# Patient Record
Sex: FEMALE | Race: WHITE | Employment: FULL TIME | URBAN - METROPOLITAN AREA
[De-identification: names, ages, dates, MRNs, and addresses within clinical notes are randomized per-mention and may not be internally consistent; named-entity substitution may affect disease eponyms.]

---

## 2017-01-28 ENCOUNTER — OFFICE VISIT (OUTPATIENT)
Dept: FAMILY MEDICINE CLINIC | Facility: CLINIC | Age: 57
End: 2017-01-28

## 2017-01-28 ENCOUNTER — TELEPHONE (OUTPATIENT)
Dept: FAMILY MEDICINE CLINIC | Facility: CLINIC | Age: 57
End: 2017-01-28

## 2017-01-28 VITALS
SYSTOLIC BLOOD PRESSURE: 114 MMHG | TEMPERATURE: 98 F | BODY MASS INDEX: 43.87 KG/M2 | WEIGHT: 257 LBS | DIASTOLIC BLOOD PRESSURE: 70 MMHG | HEIGHT: 64 IN | HEART RATE: 88 BPM

## 2017-01-28 DIAGNOSIS — J06.9 VIRAL UPPER RESPIRATORY TRACT INFECTION: Primary | ICD-10-CM

## 2017-01-28 PROBLEM — Z90.89 S/P TONSILLECTOMY: Status: ACTIVE | Noted: 2017-01-28

## 2017-01-28 PROBLEM — E66.9 OBESITY: Status: ACTIVE | Noted: 2017-01-28

## 2017-01-28 PROCEDURE — 99214 OFFICE O/P EST MOD 30 MIN: CPT | Performed by: FAMILY MEDICINE

## 2017-01-28 RX ORDER — MULTIVITAMIN
1 CAPSULE ORAL DAILY
COMMUNITY
Start: 2014-09-25

## 2017-01-28 RX ORDER — SODIUM PHOSPHATE,MONO-DIBASIC 19G-7G/118
1 ENEMA (ML) RECTAL DAILY
COMMUNITY
Start: 2015-06-29

## 2017-01-28 RX ORDER — IBUPROFEN 200 MG
600 CAPSULE ORAL DAILY
COMMUNITY
Start: 2014-09-25

## 2017-01-28 RX ORDER — TIOCONAZOLE 6.5 %
1 OINTMENT WITH PREFILLED APPLICATOR VAGINAL DAILY
COMMUNITY
Start: 2014-09-25

## 2017-01-28 RX ORDER — LISINOPRIL 10 MG/1
TABLET ORAL
COMMUNITY
Start: 2014-11-11 | End: 2017-03-18

## 2017-01-28 RX ORDER — MAGNESIUM 200 MG
1 TABLET ORAL DAILY
COMMUNITY
Start: 2016-07-27

## 2017-01-28 RX ORDER — CHLORHEXIDINE/GLYCERIN/HE-CELL
1 JELLY (GRAM) TOPICAL DAILY
COMMUNITY
Start: 2014-09-25

## 2017-01-28 RX ORDER — LISINOPRIL 10 MG/1
10 TABLET ORAL
Refills: 1 | COMMUNITY
Start: 2016-12-20 | End: 2017-01-28

## 2017-01-28 RX ORDER — ALPHA LIPOIC ACID 200 MG
1 CAPSULE ORAL DAILY
COMMUNITY
Start: 2014-10-15

## 2017-01-28 RX ORDER — LACTOBACILLUS COMBINATION NO.4 3B CELL
1 CAPSULE ORAL DAILY
COMMUNITY
Start: 2014-09-25

## 2017-01-28 RX ORDER — BIOTIN 1 MG
1 TABLET ORAL DAILY
COMMUNITY
Start: 2014-09-25

## 2017-01-28 RX ORDER — METOPROLOL SUCCINATE 50 MG/1
TABLET, EXTENDED RELEASE ORAL
COMMUNITY
Start: 2014-10-15 | End: 2017-03-18

## 2017-01-28 NOTE — PATIENT INSTRUCTIONS
Rest, fluids, hydrate,   Use mucinex DM for nasal congestin and cough. Vitamin c, zinc, and garlic to help your immune system. Tylenol and ibuprofen as appropriate. Call if not improving in 10-14 days to be re-seen.   Saline nasal spray or nasal rinse

## 2017-01-28 NOTE — TELEPHONE ENCOUNTER
Patient with sinus infection. Was seen at urgent care earlier this week. Symptoms not resolving. Discussed with Dr. Osbaldo Marcano. Made appt for patient to see Dr. Osbaldo Marcano this morning.  Radu Longo Minor, 01/28/2017, 9:14 AM

## 2017-02-02 ENCOUNTER — TELEPHONE (OUTPATIENT)
Dept: FAMILY MEDICINE CLINIC | Facility: CLINIC | Age: 57
End: 2017-02-02

## 2017-02-02 NOTE — TELEPHONE ENCOUNTER
Called to patient who states that she thinks everything is ok. Not sure why she called earlier. Patient still with some sinus congestion. Has been using sinus rinse and that is helping. Discussed humidifier, mucinex DM, and vicks.  Also recommended changing

## 2017-03-02 ENCOUNTER — LAB ENCOUNTER (OUTPATIENT)
Dept: LAB | Age: 57
End: 2017-03-02
Attending: FAMILY MEDICINE
Payer: COMMERCIAL

## 2017-03-02 ENCOUNTER — OFFICE VISIT (OUTPATIENT)
Dept: FAMILY MEDICINE CLINIC | Facility: CLINIC | Age: 57
End: 2017-03-02

## 2017-03-02 VITALS
SYSTOLIC BLOOD PRESSURE: 148 MMHG | TEMPERATURE: 98 F | RESPIRATION RATE: 18 BRPM | BODY MASS INDEX: 44.35 KG/M2 | DIASTOLIC BLOOD PRESSURE: 84 MMHG | WEIGHT: 263 LBS | HEART RATE: 88 BPM | HEIGHT: 64.75 IN

## 2017-03-02 DIAGNOSIS — E78.49 FAMILIAL MULTIPLE LIPOPROTEIN-TYPE HYPERLIPIDEMIA: ICD-10-CM

## 2017-03-02 DIAGNOSIS — E66.01 MORBID OBESITY DUE TO EXCESS CALORIES (HCC): ICD-10-CM

## 2017-03-02 DIAGNOSIS — I83.12 VARICOSE VEINS OF BOTH LOWER EXTREMITIES WITH INFLAMMATION: Primary | ICD-10-CM

## 2017-03-02 DIAGNOSIS — Z01.419 ENCOUNTER FOR GYNECOLOGICAL EXAMINATION WITHOUT ABNORMAL FINDING: ICD-10-CM

## 2017-03-02 DIAGNOSIS — G47.33 OBSTRUCTIVE SLEEP APNEA SYNDROME: ICD-10-CM

## 2017-03-02 DIAGNOSIS — I83.11 VARICOSE VEINS OF BOTH LOWER EXTREMITIES WITH INFLAMMATION: Primary | ICD-10-CM

## 2017-03-02 DIAGNOSIS — N85.2 HYPERTROPHY OF UTERUS: ICD-10-CM

## 2017-03-02 DIAGNOSIS — E55.9 VITAMIN D DEFICIENCY: ICD-10-CM

## 2017-03-02 DIAGNOSIS — I10 ESSENTIAL HYPERTENSION: ICD-10-CM

## 2017-03-02 DIAGNOSIS — I83.12 VARICOSE VEINS OF BOTH LOWER EXTREMITIES WITH INFLAMMATION: ICD-10-CM

## 2017-03-02 DIAGNOSIS — R01.1 UNDIAGNOSED CARDIAC MURMURS: ICD-10-CM

## 2017-03-02 DIAGNOSIS — I83.11 VARICOSE VEINS OF BOTH LOWER EXTREMITIES WITH INFLAMMATION: ICD-10-CM

## 2017-03-02 LAB
25-HYDROXYVITAMIN D (TOTAL): 39.8 NG/ML (ref 30–100)
ALBUMIN SERPL-MCNC: 3.7 G/DL (ref 3.5–4.8)
ALP LIVER SERPL-CCNC: 92 U/L (ref 46–118)
ALT SERPL-CCNC: 18 U/L (ref 14–54)
AST SERPL-CCNC: 12 U/L (ref 15–41)
BASOPHILS # BLD AUTO: 0.01 X10(3) UL (ref 0–0.1)
BASOPHILS NFR BLD AUTO: 0.2 %
BILIRUB SERPL-MCNC: 0.5 MG/DL (ref 0.1–2)
BILIRUB UR QL STRIP.AUTO: NEGATIVE
BUN BLD-MCNC: 17 MG/DL (ref 8–20)
CALCIUM BLD-MCNC: 8.9 MG/DL (ref 8.3–10.3)
CHLORIDE: 107 MMOL/L (ref 101–111)
CHOLEST SMN-MCNC: 203 MG/DL (ref ?–200)
CLARITY UR REFRACT.AUTO: CLEAR
CO2: 28 MMOL/L (ref 22–32)
CREAT BLD-MCNC: 0.76 MG/DL (ref 0.55–1.02)
EOSINOPHIL # BLD AUTO: 0.11 X10(3) UL (ref 0–0.3)
EOSINOPHIL NFR BLD AUTO: 1.8 %
ERYTHROCYTE [DISTWIDTH] IN BLOOD BY AUTOMATED COUNT: 12.8 % (ref 11.5–16)
GLUCOSE BLD-MCNC: 89 MG/DL (ref 70–99)
GLUCOSE UR STRIP.AUTO-MCNC: NEGATIVE MG/DL
HCT VFR BLD AUTO: 41.4 % (ref 34–50)
HDLC SERPL-MCNC: 74 MG/DL (ref 45–?)
HDLC SERPL: 2.74 {RATIO} (ref ?–4.44)
HGB BLD-MCNC: 13.3 G/DL (ref 12–16)
IMMATURE GRANULOCYTE COUNT: 0.02 X10(3) UL (ref 0–1)
IMMATURE GRANULOCYTE RATIO %: 0.3 %
KETONES UR STRIP.AUTO-MCNC: NEGATIVE MG/DL
LDLC SERPL CALC-MCNC: 113 MG/DL (ref ?–130)
LEUKOCYTE ESTERASE UR QL STRIP.AUTO: NEGATIVE
LYMPHOCYTES # BLD AUTO: 1.84 X10(3) UL (ref 0.9–4)
LYMPHOCYTES NFR BLD AUTO: 30.3 %
M PROTEIN MFR SERPL ELPH: 7.3 G/DL (ref 6.1–8.3)
MCH RBC QN AUTO: 29.7 PG (ref 27–33.2)
MCHC RBC AUTO-ENTMCNC: 32.1 G/DL (ref 31–37)
MCV RBC AUTO: 92.4 FL (ref 81–100)
MONOCYTES # BLD AUTO: 0.28 X10(3) UL (ref 0.1–0.6)
MONOCYTES NFR BLD AUTO: 4.6 %
NEUTROPHIL ABS PRELIM: 3.82 X10 (3) UL (ref 1.3–6.7)
NEUTROPHILS # BLD AUTO: 3.82 X10(3) UL (ref 1.3–6.7)
NEUTROPHILS NFR BLD AUTO: 62.8 %
NITRITE UR QL STRIP.AUTO: NEGATIVE
NONHDLC SERPL-MCNC: 129 MG/DL (ref ?–130)
PH UR STRIP.AUTO: 7 [PH] (ref 4.5–8)
PLATELET # BLD AUTO: 278 10(3)UL (ref 150–450)
POTASSIUM SERPL-SCNC: 4.1 MMOL/L (ref 3.6–5.1)
PROT UR STRIP.AUTO-MCNC: NEGATIVE MG/DL
RBC # BLD AUTO: 4.48 X10(6)UL (ref 3.8–5.1)
RBC UR QL AUTO: NEGATIVE
RED CELL DISTRIBUTION WIDTH-SD: 42.5 FL (ref 35.1–46.3)
SODIUM SERPL-SCNC: 142 MMOL/L (ref 136–144)
SP GR UR STRIP.AUTO: 1.01 (ref 1–1.03)
TRIGLYCERIDES: 79 MG/DL (ref ?–150)
UROBILINOGEN UR STRIP.AUTO-MCNC: <2 MG/DL
VLDL: 16 MG/DL (ref 5–40)
WBC # BLD AUTO: 6.1 X10(3) UL (ref 4–13)

## 2017-03-02 PROCEDURE — 80053 COMPREHEN METABOLIC PANEL: CPT

## 2017-03-02 PROCEDURE — 88175 CYTOPATH C/V AUTO FLUID REDO: CPT | Performed by: FAMILY MEDICINE

## 2017-03-02 PROCEDURE — 81003 URINALYSIS AUTO W/O SCOPE: CPT

## 2017-03-02 PROCEDURE — 99214 OFFICE O/P EST MOD 30 MIN: CPT | Performed by: FAMILY MEDICINE

## 2017-03-02 PROCEDURE — 87624 HPV HI-RISK TYP POOLED RSLT: CPT | Performed by: FAMILY MEDICINE

## 2017-03-02 PROCEDURE — 99396 PREV VISIT EST AGE 40-64: CPT | Performed by: FAMILY MEDICINE

## 2017-03-02 PROCEDURE — 85025 COMPLETE CBC W/AUTO DIFF WBC: CPT

## 2017-03-02 PROCEDURE — 82306 VITAMIN D 25 HYDROXY: CPT

## 2017-03-02 PROCEDURE — 80061 LIPID PANEL: CPT

## 2017-03-02 NOTE — PROGRESS NOTES
[facility] [place]  PROGRESS NOTE        HPI:   This is a 60year old female coming in for Patient presents with:  Physical    HPI states she is doing very well has no new concerns.   Her last colonoscopy in 2014 a Tdap 819 of 2010 Pap 11/3/2015 Obliteration of lymphatic vessel     Obesity     Sleep apnea     S/P tonsillectomy     Undiagnosed cardiac murmurs     Hypertrophy of uterus     Varicose veins of lower extremities with inflammation     Varicose veins of both lower extremities     Vitamin confirmed negative in the right inguinal area and confirmed negative in the left inguinal area.    Genitourinary: Rectum normal and vagina normal. Rectal exam shows no external hemorrhoid, no internal hemorrhoid, no fissure, no mass, no tenderness and anal lipoprotein-type hyperlipidemia  -     CBC W Differential W Platelet [E]; Future  -     CMP; Future  -     Lipid Panel [E]; Future  -     Vitamin D, 25-Hydroxy [E]; Future  -     UA/M With Culture Reflex [E];  Future    Essential hypertension  -     CBC W D questions, complications, allergies, or worsening or changing symptoms. Parent is to call with any side effects or complications from the treatments as a result of today.        Deedee Hinton MD  3/2/2017  10:06 AM    There is no immunization history on fi

## 2017-03-03 ENCOUNTER — TELEPHONE (OUTPATIENT)
Dept: FAMILY MEDICINE CLINIC | Facility: CLINIC | Age: 57
End: 2017-03-03

## 2017-03-03 NOTE — TELEPHONE ENCOUNTER
----- Message from Wellington Sexton MD sent at 3/3/2017  4:22 AM CST -----  Vitamin D is near normal. Continue present managment   cholesterol are borderline,  Further attention to diet at this time.    Please give pt  Local vascular doctor's number:  Dr. Brooklynn Bowles

## 2017-03-03 NOTE — TELEPHONE ENCOUNTER
Patient notified of results and recommendations and expressed understanding.   Patient given contact number for Dr. Lucie Tim, 03/03/2017, 11:44 AM

## 2017-03-04 LAB — HPV I/H RISK 1 DNA SPEC QL NAA+PROBE: NEGATIVE

## 2017-03-07 ENCOUNTER — TELEPHONE (OUTPATIENT)
Dept: FAMILY MEDICINE CLINIC | Facility: CLINIC | Age: 57
End: 2017-03-07

## 2017-03-07 NOTE — TELEPHONE ENCOUNTER
----- Message from Magdi Reynoso MD sent at 3/7/2017 10:06 AM CST -----  Negative pap and HPV. Repeat pap in 5 years   Or as clinically decided.

## 2017-03-07 NOTE — TELEPHONE ENCOUNTER
Patient notified of results and recommendations and expressed understanding.     Sherry Tim, 03/07/2017, 10:26 AM

## 2017-03-10 ENCOUNTER — MED REC SCAN ONLY (OUTPATIENT)
Dept: FAMILY MEDICINE CLINIC | Facility: CLINIC | Age: 57
End: 2017-03-10

## 2017-03-10 ENCOUNTER — MA CHART PREP (OUTPATIENT)
Dept: FAMILY MEDICINE CLINIC | Facility: CLINIC | Age: 57
End: 2017-03-10

## 2017-03-10 LAB — HEP C VIRUS AB: NEGATIVE

## 2017-03-18 RX ORDER — LISINOPRIL 10 MG/1
TABLET ORAL
Qty: 90 TABLET | Refills: 1 | Status: SHIPPED | OUTPATIENT
Start: 2017-03-18 | End: 2017-09-17

## 2017-03-18 RX ORDER — METOPROLOL SUCCINATE 50 MG/1
TABLET, EXTENDED RELEASE ORAL
Qty: 90 TABLET | Refills: 1 | Status: SHIPPED | OUTPATIENT
Start: 2017-03-18 | End: 2017-09-17

## 2017-06-19 ENCOUNTER — TELEPHONE (OUTPATIENT)
Dept: FAMILY MEDICINE CLINIC | Facility: CLINIC | Age: 57
End: 2017-06-19

## 2017-06-20 NOTE — TELEPHONE ENCOUNTER
Patient states she was having left arm pain over the weekend  Started at the shoulder and went down to her thumb  Went to the ER  States they did EKG and was normal  Patient states she knows she has bursitis in her shoulder and tendonitis in her elbow  Als

## 2017-06-20 NOTE — TELEPHONE ENCOUNTER
Patient notified and expressed understanding  Patient states she thinks the problem is her thumb  States there is swelling around thumb  States she has a \"hand exerciser\" that she uses and when she does it with the thumb she feels a tinge of pain up to h

## 2017-09-18 RX ORDER — LISINOPRIL 10 MG/1
TABLET ORAL
Qty: 90 TABLET | Refills: 1 | Status: SHIPPED | OUTPATIENT
Start: 2017-09-18 | End: 2018-03-14

## 2017-09-18 RX ORDER — METOPROLOL SUCCINATE 50 MG/1
TABLET, EXTENDED RELEASE ORAL
Qty: 90 TABLET | Refills: 1 | Status: SHIPPED | OUTPATIENT
Start: 2017-09-18 | End: 2018-03-14

## 2017-09-18 NOTE — TELEPHONE ENCOUNTER
Future appt:  None  Last Appointment:  3/2/2017    Cholesterol, Total (mg/dL)   Date Value   03/02/2017 203 (H)   ----------  HDL Cholesterol (mg/dL)   Date Value   03/02/2017 74   ----------  LDL Cholesterol (mg/dL)   Date Value   03/02/2017 113   -------

## 2018-03-14 RX ORDER — METOPROLOL SUCCINATE 50 MG/1
TABLET, EXTENDED RELEASE ORAL
Qty: 90 TABLET | Refills: 0 | Status: SHIPPED | OUTPATIENT
Start: 2018-03-14 | End: 2018-06-10

## 2018-03-14 RX ORDER — LISINOPRIL 10 MG/1
TABLET ORAL
Qty: 90 TABLET | Refills: 0 | Status: SHIPPED | OUTPATIENT
Start: 2018-03-14 | End: 2018-06-10

## 2018-03-14 NOTE — TELEPHONE ENCOUNTER
Pt is due for follow up appointment and fasting labs    Please have pt  Schedule apptointment . Refills until appointmentt ok.

## 2018-03-14 NOTE — TELEPHONE ENCOUNTER
Future appt:  None  Last Appointment:  3/2/2017; Follow up for sleep in 1 months sooner if problems.     Cholesterol, Total (mg/dL)   Date Value   03/02/2017 203 (H)   ----------  HDL Cholesterol (mg/dL)   Date Value   03/02/2017 74   ----------  LDL Choles

## 2018-03-14 NOTE — TELEPHONE ENCOUNTER
Pt informed.   Future Appointments  Date Time Provider Carmen Barrera   3/24/2018 9:15 AM REF [place] [facility]   4/19/2018 4:00 PM Babak Kearns MD [facility] [place] [facility] [place]

## 2018-03-21 ENCOUNTER — TELEPHONE (OUTPATIENT)
Dept: FAMILY MEDICINE CLINIC | Facility: CLINIC | Age: 58
End: 2018-03-21

## 2018-03-21 DIAGNOSIS — E78.49 FAMILIAL MULTIPLE LIPOPROTEIN-TYPE HYPERLIPIDEMIA: ICD-10-CM

## 2018-03-21 DIAGNOSIS — E66.01 CLASS 3 SEVERE OBESITY DUE TO EXCESS CALORIES WITHOUT SERIOUS COMORBIDITY WITH BODY MASS INDEX (BMI) OF 40.0 TO 44.9 IN ADULT (HCC): ICD-10-CM

## 2018-03-21 DIAGNOSIS — I10 ESSENTIAL HYPERTENSION: Primary | ICD-10-CM

## 2018-03-21 DIAGNOSIS — E55.9 VITAMIN D DEFICIENCY: ICD-10-CM

## 2018-03-21 NOTE — TELEPHONE ENCOUNTER
----- Message from Antwan Gamboa sent at 3/21/2018 11:37 AM CDT -----  Regarding: lab orders needed   Patient has lab appointment on 3/24/18 could you please put lab orders in system.         Thanks,  Beba

## 2018-03-23 NOTE — TELEPHONE ENCOUNTER
Labs in chart are from March 2017 - this was a year ago  Please advise fasting lab orders  Patient has appt tomorrow  Dr. Mukesh Lindsay out of the office  Forwarded to Kishan Foster for review    Linda Tim, 03/23/18, 9:35 AM

## 2018-03-23 NOTE — TELEPHONE ENCOUNTER
Patient just had complete labs in may is there something in particular she was wanted rechecked? Her cholesterol is borderline so I suggest rechecking it in September.

## 2018-03-30 ENCOUNTER — LABORATORY ENCOUNTER (OUTPATIENT)
Dept: LAB | Age: 58
End: 2018-03-30
Attending: FAMILY MEDICINE
Payer: COMMERCIAL

## 2018-03-30 DIAGNOSIS — E66.01 CLASS 3 SEVERE OBESITY DUE TO EXCESS CALORIES WITHOUT SERIOUS COMORBIDITY WITH BODY MASS INDEX (BMI) OF 40.0 TO 44.9 IN ADULT (HCC): ICD-10-CM

## 2018-03-30 DIAGNOSIS — E78.49 FAMILIAL MULTIPLE LIPOPROTEIN-TYPE HYPERLIPIDEMIA: ICD-10-CM

## 2018-03-30 DIAGNOSIS — I10 ESSENTIAL HYPERTENSION: ICD-10-CM

## 2018-03-30 DIAGNOSIS — E55.9 VITAMIN D DEFICIENCY: ICD-10-CM

## 2018-03-30 PROCEDURE — 80050 GENERAL HEALTH PANEL: CPT | Performed by: FAMILY MEDICINE

## 2018-03-30 PROCEDURE — 36415 COLL VENOUS BLD VENIPUNCTURE: CPT | Performed by: FAMILY MEDICINE

## 2018-03-30 PROCEDURE — 80061 LIPID PANEL: CPT | Performed by: FAMILY MEDICINE

## 2018-03-30 PROCEDURE — 82306 VITAMIN D 25 HYDROXY: CPT | Performed by: FAMILY MEDICINE

## 2018-03-31 ENCOUNTER — TELEPHONE (OUTPATIENT)
Dept: FAMILY MEDICINE CLINIC | Facility: CLINIC | Age: 58
End: 2018-03-31

## 2018-03-31 DIAGNOSIS — E55.9 VITAMIN D DEFICIENCY: Primary | ICD-10-CM

## 2018-03-31 NOTE — TELEPHONE ENCOUNTER
----- Message from Kika Lang MD sent at 3/31/2018  7:57 AM CDT -----  Labs are ok. Vitamin D is sub therapeutic. Recommend 2000 units of vitamin D daily  Recheck vitamin D level in 6  months.

## 2018-04-19 ENCOUNTER — OFFICE VISIT (OUTPATIENT)
Dept: FAMILY MEDICINE CLINIC | Facility: CLINIC | Age: 58
End: 2018-04-19

## 2018-04-19 VITALS
TEMPERATURE: 98 F | BODY MASS INDEX: 45.57 KG/M2 | RESPIRATION RATE: 20 BRPM | WEIGHT: 270.19 LBS | DIASTOLIC BLOOD PRESSURE: 72 MMHG | HEART RATE: 72 BPM | HEIGHT: 64.75 IN | SYSTOLIC BLOOD PRESSURE: 126 MMHG

## 2018-04-19 DIAGNOSIS — I10 ESSENTIAL HYPERTENSION: ICD-10-CM

## 2018-04-19 DIAGNOSIS — E78.49 FAMILIAL MULTIPLE LIPOPROTEIN-TYPE HYPERLIPIDEMIA: Primary | ICD-10-CM

## 2018-04-19 PROCEDURE — 99214 OFFICE O/P EST MOD 30 MIN: CPT | Performed by: FAMILY MEDICINE

## 2018-04-19 NOTE — PROGRESS NOTES
[facility] [place]  PROGRESS NOTE        HPI:   This is a 59year old female coming in for Patient presents with:  Medication Request: Refills on her medications. HPI  Patient is overall doing very well.   taking her medications regularly. uL   Monocyte Absolute 0.34 0.10 - 1.00 x10(3) uL   Eosinophil Absolute 0.13 0.00 - 0.30 x10(3) uL   Basophil Absolute 0.02 0.00 - 0.10 x10(3) uL   Immature Granulocyte Absolute 0.01 0.00 - 1.00 x10(3) uL   Neutrophil % 62.2 %   Lymphocyte % 28.8 %   Monoc examination     Essential hypertension     Lipoma     Obliteration of lymphatic vessel     Obesity     Sleep apnea     S/P tonsillectomy     Undiagnosed cardiac murmurs     Hypertrophy of uterus     Varicose veins of lower extremities with inflammation Musculoskeletal: Normal range of motion. Neurological: She is alert and oriented to person, place, and time. Skin: Skin is warm and dry. She is not diaphoretic. Psychiatric: She has a normal mood and affect.  Her behavior is normal. Judgment and tho

## 2018-06-11 RX ORDER — METOPROLOL SUCCINATE 50 MG/1
TABLET, EXTENDED RELEASE ORAL
Qty: 90 TABLET | Refills: 0 | Status: SHIPPED | OUTPATIENT
Start: 2018-06-11 | End: 2018-09-15

## 2018-06-11 RX ORDER — LISINOPRIL 10 MG/1
TABLET ORAL
Qty: 90 TABLET | Refills: 0 | Status: SHIPPED | OUTPATIENT
Start: 2018-06-11 | End: 2018-09-15

## 2018-06-11 NOTE — TELEPHONE ENCOUNTER
Future appt:     Your appointments     Date & Time Appointment Department [facility])    Oct 27, 2018  9:00 AM CDT Physical - Established Patient with Lacie Gibbs MD [address] [place] [address] [facility]

## 2018-06-15 RX ORDER — LISINOPRIL 10 MG/1
TABLET ORAL
Qty: 90 TABLET | Refills: 0 | OUTPATIENT
Start: 2018-06-15

## 2018-06-15 RX ORDER — METOPROLOL SUCCINATE 50 MG/1
TABLET, EXTENDED RELEASE ORAL
Qty: 90 TABLET | Refills: 0 | OUTPATIENT
Start: 2018-06-15

## 2018-06-15 NOTE — TELEPHONE ENCOUNTER
Future appt:     Your appointments     Date & Time Appointment Department [facility])    Oct 27, 2018  9:00 AM CDT Physical - Established Patient with Merari Cedeno MD [address] [place] [address]

## 2018-06-15 NOTE — TELEPHONE ENCOUNTER
Meds refilled on 6/11/18  Receipt confirmed by pharmacy    These refills denied    Sandip Tim, 06/15/18, 11:14 AM

## 2018-09-17 RX ORDER — METOPROLOL SUCCINATE 50 MG/1
TABLET, EXTENDED RELEASE ORAL
Qty: 90 TABLET | Refills: 1 | Status: SHIPPED | OUTPATIENT
Start: 2018-09-17 | End: 2018-11-03

## 2018-09-17 RX ORDER — LISINOPRIL 10 MG/1
TABLET ORAL
Qty: 90 TABLET | Refills: 1 | Status: SHIPPED | OUTPATIENT
Start: 2018-09-17 | End: 2018-11-03

## 2018-09-17 NOTE — TELEPHONE ENCOUNTER
Future appt:     Your appointments     Date & Time Appointment Department [facility])    Oct 27, 2018  9:00 AM CDT Physical - Established Patient with Kailash Alvarez MD [address] [place] [address]        Viri Serrano

## 2018-11-03 ENCOUNTER — APPOINTMENT (OUTPATIENT)
Dept: LAB | Age: 58
End: 2018-11-03
Attending: FAMILY MEDICINE
Payer: COMMERCIAL

## 2018-11-03 ENCOUNTER — OFFICE VISIT (OUTPATIENT)
Dept: FAMILY MEDICINE CLINIC | Facility: CLINIC | Age: 58
End: 2018-11-03
Payer: COMMERCIAL

## 2018-11-03 VITALS
RESPIRATION RATE: 18 BRPM | SYSTOLIC BLOOD PRESSURE: 126 MMHG | HEIGHT: 64.25 IN | DIASTOLIC BLOOD PRESSURE: 72 MMHG | OXYGEN SATURATION: 96 % | BODY MASS INDEX: 46.2 KG/M2 | HEART RATE: 95 BPM | WEIGHT: 270.63 LBS | TEMPERATURE: 98 F

## 2018-11-03 DIAGNOSIS — I10 ESSENTIAL HYPERTENSION: ICD-10-CM

## 2018-11-03 DIAGNOSIS — E55.9 VITAMIN D DEFICIENCY: ICD-10-CM

## 2018-11-03 DIAGNOSIS — Z00.00 ENCOUNTER FOR ROUTINE ADULT HEALTH EXAMINATION WITHOUT ABNORMAL FINDINGS: Primary | ICD-10-CM

## 2018-11-03 DIAGNOSIS — E78.49 FAMILIAL MULTIPLE LIPOPROTEIN-TYPE HYPERLIPIDEMIA: ICD-10-CM

## 2018-11-03 PROCEDURE — 87086 URINE CULTURE/COLONY COUNT: CPT | Performed by: FAMILY MEDICINE

## 2018-11-03 PROCEDURE — 82607 VITAMIN B-12: CPT | Performed by: FAMILY MEDICINE

## 2018-11-03 PROCEDURE — 87186 SC STD MICRODIL/AGAR DIL: CPT | Performed by: FAMILY MEDICINE

## 2018-11-03 PROCEDURE — 36415 COLL VENOUS BLD VENIPUNCTURE: CPT | Performed by: FAMILY MEDICINE

## 2018-11-03 PROCEDURE — 82550 ASSAY OF CK (CPK): CPT | Performed by: FAMILY MEDICINE

## 2018-11-03 PROCEDURE — 99396 PREV VISIT EST AGE 40-64: CPT | Performed by: FAMILY MEDICINE

## 2018-11-03 PROCEDURE — 87088 URINE BACTERIA CULTURE: CPT | Performed by: FAMILY MEDICINE

## 2018-11-03 PROCEDURE — 93000 ELECTROCARDIOGRAM COMPLETE: CPT | Performed by: FAMILY MEDICINE

## 2018-11-03 PROCEDURE — 80053 COMPREHEN METABOLIC PANEL: CPT | Performed by: FAMILY MEDICINE

## 2018-11-03 PROCEDURE — 81001 URINALYSIS AUTO W/SCOPE: CPT | Performed by: FAMILY MEDICINE

## 2018-11-03 PROCEDURE — 80061 LIPID PANEL: CPT | Performed by: FAMILY MEDICINE

## 2018-11-03 PROCEDURE — 82306 VITAMIN D 25 HYDROXY: CPT | Performed by: FAMILY MEDICINE

## 2018-11-03 PROCEDURE — 87077 CULTURE AEROBIC IDENTIFY: CPT | Performed by: FAMILY MEDICINE

## 2018-11-03 PROCEDURE — 84443 ASSAY THYROID STIM HORMONE: CPT | Performed by: FAMILY MEDICINE

## 2018-11-03 RX ORDER — METOPROLOL SUCCINATE 50 MG/1
50 TABLET, EXTENDED RELEASE ORAL
Qty: 90 TABLET | Refills: 1 | Status: SHIPPED | OUTPATIENT
Start: 2018-11-03 | End: 2018-11-05

## 2018-11-03 RX ORDER — LISINOPRIL 10 MG/1
10 TABLET ORAL
Qty: 90 TABLET | Refills: 1 | Status: SHIPPED | OUTPATIENT
Start: 2018-11-03 | End: 2018-11-05

## 2018-11-03 NOTE — PROGRESS NOTES
[facility] [place]  PROGRESS NOTE        HPI:   This is a 59year old female coming in for Patient presents with:  Physical    HPI  Pt is overall doing well.    Has some arthrits in her hands which bothers her during her working out, previously s 0. 34 0.10 - 1.00 x10(3) uL    Eosinophil Absolute 0.13 0.00 - 0.30 x10(3) uL    Basophil Absolute 0.02 0.00 - 0.10 x10(3) uL    Immature Granulocyte Absolute 0.01 0.00 - 1.00 x10(3) uL    Neutrophil % 62.2 %    Lymphocyte % 28.8 %    Monocyte % 6.1 %    Eo OIL) 1000 MG Oral Cap Take 1 capsule by mouth daily. Disp:  Rfl:    Probiotic Product ([employer] PROBIOTIC) Oral Cap Take 1 capsule by mouth daily.    Disp:  Rfl:       Counseling given: Not Answered         Problem List:  Patient Active Problem List:     Clemencia Cueto regular rhythm and normal heart sounds. Pulmonary/Chest: Effort normal and breath sounds normal.   Abdominal: Soft. Bowel sounds are normal. She exhibits no distension. Musculoskeletal: Normal range of motion.    Curvature of digits, with enlarged dip's appointment:    No Follow-up on file. Meds & Refills for this Visit:  Requested Prescriptions     Signed Prescriptions Disp Refills   • lisinopril 10 MG Oral Tab 90 tablet 1     Sig: Take 1 tablet (10 mg total) by mouth once daily.    • Metoprolol Succin

## 2018-11-05 ENCOUNTER — TELEPHONE (OUTPATIENT)
Dept: FAMILY MEDICINE CLINIC | Facility: CLINIC | Age: 58
End: 2018-11-05

## 2018-11-05 DIAGNOSIS — N39.0 URINARY TRACT INFECTION WITHOUT HEMATURIA, SITE UNSPECIFIED: Primary | ICD-10-CM

## 2018-11-05 RX ORDER — METOPROLOL SUCCINATE 50 MG/1
50 TABLET, EXTENDED RELEASE ORAL
Qty: 90 TABLET | Refills: 1 | Status: SHIPPED | OUTPATIENT
Start: 2018-11-05 | End: 2018-12-10

## 2018-11-05 RX ORDER — LISINOPRIL 10 MG/1
10 TABLET ORAL
Qty: 90 TABLET | Refills: 1 | Status: SHIPPED | OUTPATIENT
Start: 2018-11-05 | End: 2019-06-01

## 2018-11-05 RX ORDER — CEPHALEXIN 500 MG/1
500 CAPSULE ORAL 3 TIMES DAILY
Qty: 21 CAPSULE | Refills: 0 | Status: SHIPPED | OUTPATIENT
Start: 2018-11-05 | End: 2018-11-12

## 2018-11-05 NOTE — TELEPHONE ENCOUNTER
----- Message from Sarah Yusuf MD sent at 11/5/2018 11:07 AM CST -----  Recommend start Keflex 500 mg 3 times daily times 7 days follow-up UA and culture in 1 week after finishing medicine. Advised to increase fluid intake.    Call if increasing pain or

## 2018-11-05 NOTE — TELEPHONE ENCOUNTER
Left message for patient to call office.     [facility], 11/05/18, 12:26 PM

## 2018-11-05 NOTE — TELEPHONE ENCOUNTER
----- Message from Zechariah Samson MD sent at 11/5/2018  8:22 AM CST -----  b12 is a bit high, can reduce supplementations. Continue vitamin d supplementation. Other labs look ok.    Continue present managment

## 2018-11-05 NOTE — TELEPHONE ENCOUNTER
Patient notified of results and recommendations and expressed understanding.   Script to [address] per patient request  Order for repeat testing into chart    Cass Ferris, 11/05/18, 5:51 PM

## 2018-11-06 NOTE — TELEPHONE ENCOUNTER
Patient's Lisinopril and Metoprolol were refilled to [facility] on 11/3/18  Patient states these need to go to [facility] due to insurance  Needs new scripts sent to [facility], 11/05/18, 6:31 PM

## 2018-11-14 ENCOUNTER — LABORATORY ENCOUNTER (OUTPATIENT)
Dept: LAB | Age: 58
End: 2018-11-14
Attending: FAMILY MEDICINE
Payer: COMMERCIAL

## 2018-11-14 DIAGNOSIS — N39.0 URINARY TRACT INFECTION WITHOUT HEMATURIA, SITE UNSPECIFIED: ICD-10-CM

## 2018-11-14 PROCEDURE — 87086 URINE CULTURE/COLONY COUNT: CPT | Performed by: FAMILY MEDICINE

## 2018-11-14 PROCEDURE — 81003 URINALYSIS AUTO W/O SCOPE: CPT | Performed by: FAMILY MEDICINE

## 2018-11-17 ENCOUNTER — TELEPHONE (OUTPATIENT)
Dept: FAMILY MEDICINE CLINIC | Facility: CLINIC | Age: 58
End: 2018-11-17

## 2018-11-20 ENCOUNTER — TELEPHONE (OUTPATIENT)
Dept: FAMILY MEDICINE CLINIC | Facility: CLINIC | Age: 58
End: 2018-11-20

## 2018-11-20 DIAGNOSIS — R31.9 URINARY TRACT INFECTION WITH HEMATURIA, SITE UNSPECIFIED: Primary | ICD-10-CM

## 2018-11-20 DIAGNOSIS — N39.0 URINARY TRACT INFECTION WITH HEMATURIA, SITE UNSPECIFIED: Primary | ICD-10-CM

## 2018-11-20 NOTE — TELEPHONE ENCOUNTER
Re:   had to go to [facility]  - should be recieving results - please call hospital to get these results  -   UA  /  Yeast infection

## 2018-11-20 NOTE — TELEPHONE ENCOUNTER
Patient was seen at [facility] ER on 11/17/18 for urinary symptoms and vaginal burning  States burning was so bad she could not sit down  Patient treated for UTI with Macrobid  Patient treated for yeast with Diflucan    Patient have urine rechecked once done wit

## 2018-12-10 RX ORDER — METOPROLOL SUCCINATE 50 MG/1
50 TABLET, EXTENDED RELEASE ORAL
Qty: 90 TABLET | Refills: 1 | Status: SHIPPED | OUTPATIENT
Start: 2018-12-10 | End: 2019-06-01

## 2018-12-10 NOTE — TELEPHONE ENCOUNTER
Future appt:     Your appointments     Date & Time Appointment Department [facility])    Jun 01, 2019  8:00 AM CDT Follow up with Yoselin Perez MD [facility])        [address] [facility]

## 2019-06-01 ENCOUNTER — OFFICE VISIT (OUTPATIENT)
Dept: FAMILY MEDICINE CLINIC | Facility: CLINIC | Age: 59
End: 2019-06-01
Payer: COMMERCIAL

## 2019-06-01 VITALS
SYSTOLIC BLOOD PRESSURE: 114 MMHG | TEMPERATURE: 98 F | WEIGHT: 277.19 LBS | HEIGHT: 64.25 IN | RESPIRATION RATE: 18 BRPM | BODY MASS INDEX: 47.32 KG/M2 | DIASTOLIC BLOOD PRESSURE: 70 MMHG | HEART RATE: 80 BPM

## 2019-06-01 DIAGNOSIS — I10 ESSENTIAL HYPERTENSION: Primary | ICD-10-CM

## 2019-06-01 DIAGNOSIS — E55.9 VITAMIN D DEFICIENCY: ICD-10-CM

## 2019-06-01 DIAGNOSIS — E78.49 FAMILIAL MULTIPLE LIPOPROTEIN-TYPE HYPERLIPIDEMIA: ICD-10-CM

## 2019-06-01 PROCEDURE — 80050 GENERAL HEALTH PANEL: CPT | Performed by: FAMILY MEDICINE

## 2019-06-01 PROCEDURE — 82306 VITAMIN D 25 HYDROXY: CPT | Performed by: FAMILY MEDICINE

## 2019-06-01 PROCEDURE — 82607 VITAMIN B-12: CPT | Performed by: FAMILY MEDICINE

## 2019-06-01 PROCEDURE — 80061 LIPID PANEL: CPT | Performed by: FAMILY MEDICINE

## 2019-06-01 PROCEDURE — 99214 OFFICE O/P EST MOD 30 MIN: CPT | Performed by: FAMILY MEDICINE

## 2019-06-01 PROCEDURE — 84550 ASSAY OF BLOOD/URIC ACID: CPT | Performed by: FAMILY MEDICINE

## 2019-06-01 PROCEDURE — 82550 ASSAY OF CK (CPK): CPT | Performed by: FAMILY MEDICINE

## 2019-06-01 RX ORDER — LISINOPRIL 10 MG/1
10 TABLET ORAL
Qty: 90 TABLET | Refills: 1 | Status: SHIPPED | OUTPATIENT
Start: 2019-06-01 | End: 2019-11-09

## 2019-06-01 RX ORDER — METOPROLOL SUCCINATE 50 MG/1
50 TABLET, EXTENDED RELEASE ORAL
Qty: 90 TABLET | Refills: 1 | Status: SHIPPED | OUTPATIENT
Start: 2019-06-01 | End: 2019-11-09

## 2019-06-01 NOTE — PROGRESS NOTES
[facility] [place]  PROGRESS NOTE        HPI:   This is a 59year old female coming in for Patient presents with:  Blood Pressure: bp check    HPI    Results for orders placed or performed in visit on 11/14/18   URINALYSIS, ROUTINE   Result Valu Take 600 mg by mouth daily. Disp:  Rfl:    Cholecalciferol (VITAMIN D3) 1000 units Oral Cap Take 1 tablet by mouth daily. Disp:  Rfl:    Garlic 033 MG Oral Tab Take 1 tablet by mouth daily.    Disp:  Rfl:    Glucosamine-Chondroitin ([facility] GLUCOSAMINE-KAREEM this encounter: 277 lb 3.2 oz. Vital signs reviewed.   Wt Readings from Last 6 Encounters:  06/01/19 : 277 lb 3.2 oz  11/03/18 : 270 lb 9.6 oz  04/19/18 : 270 lb 3.2 oz  03/02/17 : 263 lb  01/28/17 : 257 lb       Physical Exam   Constitutional: She is kinjal deficiency  -     VITAMIN D, 25-HYDROXY; Future    Other orders  -     lisinopril 10 MG Oral Tab; Take 1 tablet (10 mg total) by mouth once daily.  -     Metoprolol Succinate ER 50 MG Oral Tablet 24 Hr; Take 1 tablet (50 mg total) by mouth once daily.

## 2019-06-11 ENCOUNTER — TELEPHONE (OUTPATIENT)
Dept: FAMILY MEDICINE CLINIC | Facility: CLINIC | Age: 59
End: 2019-06-11

## 2019-11-09 ENCOUNTER — OFFICE VISIT (OUTPATIENT)
Dept: FAMILY MEDICINE CLINIC | Facility: CLINIC | Age: 59
End: 2019-11-09
Payer: COMMERCIAL

## 2019-11-09 VITALS
WEIGHT: 270.81 LBS | SYSTOLIC BLOOD PRESSURE: 122 MMHG | TEMPERATURE: 98 F | HEART RATE: 98 BPM | RESPIRATION RATE: 16 BRPM | HEIGHT: 64.25 IN | BODY MASS INDEX: 46.23 KG/M2 | OXYGEN SATURATION: 94 % | DIASTOLIC BLOOD PRESSURE: 68 MMHG

## 2019-11-09 DIAGNOSIS — E78.49 FAMILIAL MULTIPLE LIPOPROTEIN-TYPE HYPERLIPIDEMIA: ICD-10-CM

## 2019-11-09 DIAGNOSIS — I10 ESSENTIAL HYPERTENSION: ICD-10-CM

## 2019-11-09 DIAGNOSIS — Z00.00 ENCOUNTER FOR ROUTINE ADULT HEALTH EXAMINATION WITHOUT ABNORMAL FINDINGS: Primary | ICD-10-CM

## 2019-11-09 DIAGNOSIS — E55.9 VITAMIN D DEFICIENCY: ICD-10-CM

## 2019-11-09 DIAGNOSIS — R39.89 ABNORMAL URINE COLOR: ICD-10-CM

## 2019-11-09 PROCEDURE — 81003 URINALYSIS AUTO W/O SCOPE: CPT | Performed by: FAMILY MEDICINE

## 2019-11-09 PROCEDURE — 99396 PREV VISIT EST AGE 40-64: CPT | Performed by: FAMILY MEDICINE

## 2019-11-09 RX ORDER — LISINOPRIL 10 MG/1
10 TABLET ORAL
Qty: 90 TABLET | Refills: 1 | Status: SHIPPED | OUTPATIENT
Start: 2019-11-09 | End: 2020-05-01

## 2019-11-09 RX ORDER — METOPROLOL SUCCINATE 50 MG/1
50 TABLET, EXTENDED RELEASE ORAL
Qty: 90 TABLET | Refills: 1 | Status: SHIPPED | OUTPATIENT
Start: 2019-11-09 | End: 2019-11-09

## 2019-11-09 RX ORDER — METOPROLOL SUCCINATE 50 MG/1
50 TABLET, EXTENDED RELEASE ORAL
Qty: 90 TABLET | Refills: 1 | Status: SHIPPED | OUTPATIENT
Start: 2019-11-09 | End: 2020-06-20

## 2019-11-09 RX ORDER — LISINOPRIL 10 MG/1
10 TABLET ORAL
Qty: 90 TABLET | Refills: 1 | Status: SHIPPED | OUTPATIENT
Start: 2019-11-09 | End: 2019-11-09

## 2019-11-09 NOTE — PROGRESS NOTES
[facility] [place]  PROGRESS NOTE        HPI:   This is a 62year old female coming in for Patient presents with:  Physical    HPI patient has had sinus congesion and ear congestion and has been hanging around since October but now is getting be 25-HYDROXY   Result Value Ref Range    25-Hydroxyvitamin D (Total) 35.1 30.0 - 100.0 ng/mL   CBC W/ DIFFERENTIAL   Result Value Ref Range    WBC 5.9 4.0 - 11.0 x10(3) uL    RBC 4.82 3.80 - 5.30 x10(6)uL    HGB 14.0 12.0 - 16.0 g/dL    HCT 43.8 35.0 - 48.0 Succinate ER 50 MG Oral Tablet 24 Hr Take 1 tablet (50 mg total) by mouth once daily. 90 tablet 1   • B Complex Vitamins (B COMPLEX-B12) Oral Tab Take 1 tablet by mouth daily. • Calcium 600 MG Oral Tab Take 600 mg by mouth daily.        • Cholecalcife Musculoskeletal: Negative. Skin: Negative. Allergic/Immunologic: Negative. Neurological: Negative. Hematological: Negative. Psychiatric/Behavioral: Negative. All other systems reviewed and are negative.       EXAM:   /68 (BP Locati behavior is normal. Judgment and thought content normal.       ASSESSMENT AND PLAN:   Darcy Wayne was seen today for physical.    Diagnoses and all orders for this visit:    Encounter for routine adult health examination without abnormal findings    Essential hyp Administered Date(s) Administered   • DTAP 08/19/2010       Most Recent Immunizations  Administered            Date(s) Administered    DTAP                  08/19/2010    Deferred                Date(s) Deferred    FLULAVAL 6 months & older 0.5 ml Prefil

## 2019-11-11 ENCOUNTER — TELEPHONE (OUTPATIENT)
Dept: FAMILY MEDICINE CLINIC | Facility: CLINIC | Age: 59
End: 2019-11-11

## 2019-11-11 NOTE — TELEPHONE ENCOUNTER
LM for patient to return call. Doesn't look like labs were drawn here unless patient was having drawn elsewhere.

## 2019-11-11 NOTE — TELEPHONE ENCOUNTER
----- Message from Kiera Saldana MD sent at 11/11/2019  7:21 AM CST -----  Normal urinalysis. Await other labs.

## 2019-11-14 NOTE — TELEPHONE ENCOUNTER
Patient informed of the below results. Patient states she has an appt in December to have her labs drawn.      Future Appointments   Date Time Provider Carmen Barrera   12/14/2019  8:15 AM REF [place] [facility]

## 2019-12-05 DIAGNOSIS — I10 ESSENTIAL HYPERTENSION: Primary | ICD-10-CM

## 2019-12-07 NOTE — TELEPHONE ENCOUNTER
Refill request from [facility]    Future appt:     Your appointments     Date & Time Appointment Department [facility])    Dec 14, 2019  8:15 AM CST Laboratory Visit with REF [facility] Lat[facility])            [facility]  [facility]

## 2019-12-09 RX ORDER — METOPROLOL SUCCINATE 50 MG/1
TABLET, EXTENDED RELEASE ORAL
Qty: 90 TABLET | Refills: 1 | Status: SHIPPED | OUTPATIENT
Start: 2019-12-09 | End: 2020-06-20

## 2019-12-14 ENCOUNTER — APPOINTMENT (OUTPATIENT)
Dept: LAB | Age: 59
End: 2019-12-14
Attending: FAMILY MEDICINE
Payer: COMMERCIAL

## 2019-12-14 DIAGNOSIS — I10 ESSENTIAL HYPERTENSION: ICD-10-CM

## 2019-12-14 DIAGNOSIS — E78.49 FAMILIAL MULTIPLE LIPOPROTEIN-TYPE HYPERLIPIDEMIA: ICD-10-CM

## 2019-12-14 PROCEDURE — 82550 ASSAY OF CK (CPK): CPT | Performed by: FAMILY MEDICINE

## 2019-12-14 PROCEDURE — 80053 COMPREHEN METABOLIC PANEL: CPT | Performed by: FAMILY MEDICINE

## 2019-12-14 PROCEDURE — 80061 LIPID PANEL: CPT | Performed by: FAMILY MEDICINE

## 2019-12-14 PROCEDURE — 36415 COLL VENOUS BLD VENIPUNCTURE: CPT | Performed by: FAMILY MEDICINE

## 2019-12-14 PROCEDURE — 84443 ASSAY THYROID STIM HORMONE: CPT | Performed by: FAMILY MEDICINE

## 2019-12-16 ENCOUNTER — TELEPHONE (OUTPATIENT)
Dept: FAMILY MEDICINE CLINIC | Facility: CLINIC | Age: 59
End: 2019-12-16

## 2019-12-16 DIAGNOSIS — E78.49 FAMILIAL MULTIPLE LIPOPROTEIN-TYPE HYPERLIPIDEMIA: Primary | ICD-10-CM

## 2019-12-16 NOTE — TELEPHONE ENCOUNTER
Pt called back. Pt states she did see results on my chart  Pt states she has not been able to focus on diet and exercise lately. Pt mentioned having increased stress. Pt states she will refocus after the first of the year.   Six month lab orders entered

## 2019-12-16 NOTE — TELEPHONE ENCOUNTER
----- Message from Oliver Fuller MD sent at 12/16/2019  9:06 AM CST -----  Cholesterol is quite high. Is patient paying attention to diet and exercise? Was patient fasting? Recommend:  Low fat low cholesterol diet. increase exercise.   Follow up lip

## 2020-03-27 ENCOUNTER — TELEPHONE (OUTPATIENT)
Dept: FAMILY MEDICINE CLINIC | Facility: CLINIC | Age: 60
End: 2020-03-27

## 2020-03-27 DIAGNOSIS — N30.01 ACUTE CYSTITIS WITH HEMATURIA: Primary | ICD-10-CM

## 2020-03-27 PROCEDURE — 99213 OFFICE O/P EST LOW 20 MIN: CPT | Performed by: FAMILY MEDICINE

## 2020-03-27 RX ORDER — CEPHALEXIN 500 MG/1
500 CAPSULE ORAL 3 TIMES DAILY
Qty: 21 CAPSULE | Refills: 0 | Status: SHIPPED | OUTPATIENT
Start: 2020-03-27 | End: 2020-06-20

## 2020-03-27 NOTE — TELEPHONE ENCOUNTER
Telephone Information:   Home Phone [phone]   Mobile [phone]     Brigid Strickland  verbally consents to a Virtual/Telephone Check-In service on 3/27/2020.   Due to COVID crisis     Patient understands and accepts financial responsibility for a

## 2020-03-27 NOTE — TELEPHONE ENCOUNTER
Patient thinks she may have a UTI but doesn't want to come in     Would like to speak to a nurse and have a call back     Can be reached at [phone]

## 2020-05-01 RX ORDER — LISINOPRIL 10 MG/1
TABLET ORAL
Qty: 90 TABLET | Refills: 1 | Status: SHIPPED | OUTPATIENT
Start: 2020-05-01 | End: 2020-06-20

## 2020-05-01 NOTE — TELEPHONE ENCOUNTER
Future appt:     Your appointments     Date & Time Appointment Department [facility])    Jun 20, 2020  8:00 AM CDT Follow Up Visit with Morelia Cruz MD [address] [place] [address], Arian Zacarias ([facility])            [employer]

## 2020-05-19 ENCOUNTER — TELEPHONE (OUTPATIENT)
Dept: FAMILY MEDICINE CLINIC | Facility: CLINIC | Age: 60
End: 2020-05-19

## 2020-05-19 NOTE — TELEPHONE ENCOUNTER
Patient called because renewal for CPAP machine through [facility] was denied due to last office visit 11/9/19.  Patient does have an appt   Future Appointments   Date Time Provider Carmen Barrera   6/20/2020  8:00 AM Sarahy Garcia MD [facility] [place] [facility] [place]

## 2020-05-19 NOTE — TELEPHONE ENCOUNTER
c pap machine denied til an apt - has one upcoming, ok to change to a video or phone visit per patient, please advise

## 2020-06-20 ENCOUNTER — OFFICE VISIT (OUTPATIENT)
Dept: FAMILY MEDICINE CLINIC | Facility: CLINIC | Age: 60
End: 2020-06-20
Payer: COMMERCIAL

## 2020-06-20 VITALS
OXYGEN SATURATION: 97 % | WEIGHT: 264 LBS | DIASTOLIC BLOOD PRESSURE: 70 MMHG | BODY MASS INDEX: 45.07 KG/M2 | HEART RATE: 85 BPM | SYSTOLIC BLOOD PRESSURE: 132 MMHG | RESPIRATION RATE: 16 BRPM | TEMPERATURE: 97 F | HEIGHT: 64.25 IN

## 2020-06-20 DIAGNOSIS — Z01.419 ENCOUNTER FOR WELL WOMAN EXAM: ICD-10-CM

## 2020-06-20 DIAGNOSIS — Z00.00 WELLNESS EXAMINATION: ICD-10-CM

## 2020-06-20 DIAGNOSIS — I10 ESSENTIAL HYPERTENSION: ICD-10-CM

## 2020-06-20 DIAGNOSIS — E55.9 VITAMIN D DEFICIENCY: ICD-10-CM

## 2020-06-20 DIAGNOSIS — Z01.419 GYNECOLOGIC EXAM NORMAL: Primary | ICD-10-CM

## 2020-06-20 DIAGNOSIS — Z01.419 ENCOUNTER FOR GYNECOLOGICAL EXAMINATION WITHOUT ABNORMAL FINDING: ICD-10-CM

## 2020-06-20 DIAGNOSIS — E78.49 FAMILIAL MULTIPLE LIPOPROTEIN-TYPE HYPERLIPIDEMIA: ICD-10-CM

## 2020-06-20 PROCEDURE — 82306 VITAMIN D 25 HYDROXY: CPT | Performed by: FAMILY MEDICINE

## 2020-06-20 PROCEDURE — 84550 ASSAY OF BLOOD/URIC ACID: CPT | Performed by: FAMILY MEDICINE

## 2020-06-20 PROCEDURE — 99396 PREV VISIT EST AGE 40-64: CPT | Performed by: FAMILY MEDICINE

## 2020-06-20 PROCEDURE — 88175 CYTOPATH C/V AUTO FLUID REDO: CPT | Performed by: FAMILY MEDICINE

## 2020-06-20 PROCEDURE — 93000 ELECTROCARDIOGRAM COMPLETE: CPT | Performed by: FAMILY MEDICINE

## 2020-06-20 PROCEDURE — 87624 HPV HI-RISK TYP POOLED RSLT: CPT | Performed by: FAMILY MEDICINE

## 2020-06-20 PROCEDURE — 82550 ASSAY OF CK (CPK): CPT | Performed by: FAMILY MEDICINE

## 2020-06-20 PROCEDURE — 82272 OCCULT BLD FECES 1-3 TESTS: CPT | Performed by: FAMILY MEDICINE

## 2020-06-20 PROCEDURE — 82607 VITAMIN B-12: CPT | Performed by: FAMILY MEDICINE

## 2020-06-20 PROCEDURE — 81003 URINALYSIS AUTO W/O SCOPE: CPT | Performed by: FAMILY MEDICINE

## 2020-06-20 PROCEDURE — 80050 GENERAL HEALTH PANEL: CPT | Performed by: FAMILY MEDICINE

## 2020-06-20 PROCEDURE — 80061 LIPID PANEL: CPT | Performed by: FAMILY MEDICINE

## 2020-06-20 RX ORDER — METOPROLOL SUCCINATE 50 MG/1
50 TABLET, EXTENDED RELEASE ORAL
Qty: 90 TABLET | Refills: 1 | Status: SHIPPED | OUTPATIENT
Start: 2020-06-20 | End: 2020-06-20

## 2020-06-20 RX ORDER — LISINOPRIL 10 MG/1
10 TABLET ORAL DAILY
Qty: 90 TABLET | Refills: 1 | Status: SHIPPED | OUTPATIENT
Start: 2020-06-20 | End: 2020-06-20

## 2020-06-20 RX ORDER — METOPROLOL SUCCINATE 50 MG/1
50 TABLET, EXTENDED RELEASE ORAL
Qty: 90 TABLET | Refills: 1 | Status: SHIPPED | OUTPATIENT
Start: 2020-06-20 | End: 2021-01-18

## 2020-06-20 RX ORDER — LISINOPRIL 10 MG/1
10 TABLET ORAL DAILY
Qty: 90 TABLET | Refills: 1 | Status: SHIPPED | OUTPATIENT
Start: 2020-06-20 | End: 2021-01-18

## 2020-06-20 NOTE — PROGRESS NOTES
HPI:   Oscar Scott is a 62year old female who presents for an Annual Health Visit. Blood pressures is doing well . Taking all her mediations. No new issues. Hearing still a problem.      Allergies:   No Known Allergies    CURRENT M Has 2 children.: one with special needs.  to [place]. Subs in schools daily        REVIEW OF SYSTEMS:     Review of Systems   Constitutional: Negative. HENT: Negative. Eyes: Negative. Respiratory: Negative.     Cardiovascular: N oriented to person, place, and time. Skin: Skin is warm and dry. Rash (mid abdomen pinpoint papules in confluence to hyperkeratotic area :   groin, with erythema, mild wet appearing. ) noted. She is not diaphoretic.    Psychiatric: She has a normal mood a REFLEX TO FREE T4  -     VITAMIN D, 25-HYDROXY  -     VITAMIN B12  -     URINALYSIS WITH CULTURE REFLEX  -     URIC ACID, SERUM  -     CBC W/ DIFFERENTIAL    Vitamin D deficiency  -     VITAMIN D, 25-HYDROXY; Future  -     VITAMIN D, 25-HYDROXY    Encounte

## 2020-06-20 NOTE — PATIENT INSTRUCTIONS
Schedule sleep follow up and bring card, and machine.    Continue present managment   GET HEARING AIDS>

## 2020-06-22 ENCOUNTER — TELEPHONE (OUTPATIENT)
Dept: FAMILY MEDICINE CLINIC | Facility: CLINIC | Age: 60
End: 2020-06-22

## 2020-06-22 NOTE — TELEPHONE ENCOUNTER
----- Message from Jacob Garcia MD sent at 6/22/2020  7:01 AM CDT -----  Cholesterols are not all to goal.    In order to lower risk of cardiovascular disease the following lifestyle modifications are recommended: Decrease sugar, simple carbohydrates, and

## 2020-06-23 ENCOUNTER — TELEPHONE (OUTPATIENT)
Dept: FAMILY MEDICINE CLINIC | Facility: CLINIC | Age: 60
End: 2020-06-23

## 2020-09-12 ENCOUNTER — OFFICE VISIT (OUTPATIENT)
Dept: FAMILY MEDICINE CLINIC | Facility: CLINIC | Age: 60
End: 2020-09-12
Payer: COMMERCIAL

## 2020-09-12 ENCOUNTER — TELEPHONE (OUTPATIENT)
Dept: FAMILY MEDICINE CLINIC | Facility: CLINIC | Age: 60
End: 2020-09-12

## 2020-09-12 VITALS
HEIGHT: 64.25 IN | OXYGEN SATURATION: 97 % | RESPIRATION RATE: 16 BRPM | SYSTOLIC BLOOD PRESSURE: 120 MMHG | BODY MASS INDEX: 41.45 KG/M2 | HEART RATE: 82 BPM | TEMPERATURE: 98 F | WEIGHT: 242.81 LBS | DIASTOLIC BLOOD PRESSURE: 78 MMHG

## 2020-09-12 DIAGNOSIS — G47.33 OBSTRUCTIVE SLEEP APNEA SYNDROME: Primary | ICD-10-CM

## 2020-09-12 DIAGNOSIS — H66.002 ACUTE SUPPURATIVE OTITIS MEDIA OF LEFT EAR WITHOUT SPONTANEOUS RUPTURE OF TYMPANIC MEMBRANE, RECURRENCE NOT SPECIFIED: ICD-10-CM

## 2020-09-12 PROCEDURE — 3008F BODY MASS INDEX DOCD: CPT | Performed by: FAMILY MEDICINE

## 2020-09-12 PROCEDURE — 3078F DIAST BP <80 MM HG: CPT | Performed by: FAMILY MEDICINE

## 2020-09-12 PROCEDURE — 3074F SYST BP LT 130 MM HG: CPT | Performed by: FAMILY MEDICINE

## 2020-09-12 PROCEDURE — 99214 OFFICE O/P EST MOD 30 MIN: CPT | Performed by: FAMILY MEDICINE

## 2020-09-12 RX ORDER — AMOXICILLIN AND CLAVULANATE POTASSIUM 875; 125 MG/1; MG/1
1 TABLET, FILM COATED ORAL 2 TIMES DAILY
Qty: 20 TABLET | Refills: 0 | Status: SHIPPED | OUTPATIENT
Start: 2020-09-12 | End: 2020-09-12

## 2020-09-12 RX ORDER — CIPROFLOXACIN AND DEXAMETHASONE 3; 1 MG/ML; MG/ML
4 SUSPENSION/ DROPS AURICULAR (OTIC) 2 TIMES DAILY
Qty: 1 BOTTLE | Refills: 0 | Status: SHIPPED | OUTPATIENT
Start: 2020-09-12 | End: 2021-10-02 | Stop reason: ALTCHOICE

## 2020-09-12 RX ORDER — CIPROFLOXACIN AND DEXAMETHASONE 3; 1 MG/ML; MG/ML
4 SUSPENSION/ DROPS AURICULAR (OTIC) 2 TIMES DAILY
Qty: 1 BOTTLE | Refills: 0 | Status: SHIPPED | OUTPATIENT
Start: 2020-09-12 | End: 2020-09-12

## 2020-09-12 RX ORDER — AMOXICILLIN AND CLAVULANATE POTASSIUM 875; 125 MG/1; MG/1
1 TABLET, FILM COATED ORAL 2 TIMES DAILY
Qty: 20 TABLET | Refills: 0 | Status: SHIPPED | OUTPATIENT
Start: 2020-09-12 | End: 2020-09-22

## 2020-09-12 NOTE — PROGRESS NOTES
[facility]  SLEEP [facility]        HPI:   This is a 56year old female coming in for No chief complaint on file. HPI:  She is having quite a bit of leak but doesn't realize it she is using a nasal mask.        Patient: Sleep revi Component Value Date    B12 841 2020         Past Medical History:   Diagnosis Date   • Anxiety    • Arthritis    • Essential hypertension    • Sleep apnea      Past Surgical History:   Procedure Laterality Date   •        Social History: times daily for 10 days. 20 tablet 0   • ciprofloxacin-dexamethasone (CIPRODEX) 0.3-0.1 % Otic Suspension Place 4 drops into the right ear 2 (two) times daily.  1 Bottle 0      Counseling given: Not Answered         Problem List:  Patient Active Problem Lis oz)  Adjusted ideal body weight: 77.2 kg (170 lb 3.8 oz)    Vital signs reviewed. Physical Exam   Constitutional: She is oriented to person, place, and time. She appears well-developed and well-nourished. HENT:   Head: Normocephalic and atraumatic.    Ri difficulty initiating sleep, but may also cause frequent awakenings. Alcohol may have a sedative effect for the first few hours following consumption, but it can then lead to frequent arousals and a non-restful night's sleep.  Medications that act as stimul lighting, and noise should be controlled to make the bedroom conducive to falling (and staying) asleep. Ideally your bedroom temperature should be 66-68 degrees. Rooms that are warm will make it more difficult to fall asleep.   Do not allow your pet to sle to refrain from driving when sleepy. COMPLIANCE is required by insurance for 4 hours a night most nights of the week. Recommend weight loss, and maintain and optimal BMI with Exercise 30 minutes most days of the week to target heart rate .      Advised

## 2020-09-12 NOTE — PATIENT INSTRUCTIONS
How to avoid insomnia  1. Wake up at the same time each day. Maintaining a regular sleep schedule is important to good sleep. 2. Eliminate alcohol and stimulants like nicotine and caffeine. The effects of caffeine can last for several hours, perhaps up t asleep. 7. Do not eat or drink right before going to bed. Eating a late dinner or snacking before going to bed can activate the digestive system and keep you up.   Drinking a lot of fluids prior to bed can overwhelm the bladder, requiring frequent visits to water and soak x 30 minutes. Tube, and the mask, and humidifier chamber. Rinse thoroughly, and dry. Start probiotic with antibiotic.

## 2020-09-19 ENCOUNTER — TELEPHONE (OUTPATIENT)
Dept: FAMILY MEDICINE CLINIC | Facility: CLINIC | Age: 60
End: 2020-09-19

## 2020-09-19 NOTE — TELEPHONE ENCOUNTER
OTW for Dr. Mukesh Lindsay,  Pt was seen for sleep follow up 9/12/20 and has ear infection at that time. Ear pain improved , however, she still has occasional pressure and needs to \"pop\" her ears.  She is flying next week and would like suggestions to clear the pre

## 2020-09-21 NOTE — TELEPHONE ENCOUNTER
Recommend schedule visit with NP this week to recheck ears and evaluate the eating palpitations again.

## 2020-09-22 ENCOUNTER — TELEPHONE (OUTPATIENT)
Dept: FAMILY MEDICINE CLINIC | Facility: CLINIC | Age: 60
End: 2020-09-22

## 2020-09-22 ENCOUNTER — OFFICE VISIT (OUTPATIENT)
Dept: FAMILY MEDICINE CLINIC | Facility: CLINIC | Age: 60
End: 2020-09-22
Payer: COMMERCIAL

## 2020-09-22 VITALS
BODY MASS INDEX: 41.38 KG/M2 | OXYGEN SATURATION: 97 % | SYSTOLIC BLOOD PRESSURE: 112 MMHG | WEIGHT: 242.38 LBS | HEART RATE: 75 BPM | RESPIRATION RATE: 16 BRPM | TEMPERATURE: 97 F | DIASTOLIC BLOOD PRESSURE: 68 MMHG | HEIGHT: 64.25 IN

## 2020-09-22 DIAGNOSIS — Z86.69 FOLLOW-UP OTITIS MEDIA, RESOLVED: Primary | ICD-10-CM

## 2020-09-22 DIAGNOSIS — Z09 FOLLOW-UP OTITIS MEDIA, RESOLVED: Primary | ICD-10-CM

## 2020-09-22 PROCEDURE — 3078F DIAST BP <80 MM HG: CPT | Performed by: NURSE PRACTITIONER

## 2020-09-22 PROCEDURE — 3008F BODY MASS INDEX DOCD: CPT | Performed by: NURSE PRACTITIONER

## 2020-09-22 PROCEDURE — 99212 OFFICE O/P EST SF 10 MIN: CPT | Performed by: NURSE PRACTITIONER

## 2020-09-22 PROCEDURE — 3074F SYST BP LT 130 MM HG: CPT | Performed by: NURSE PRACTITIONER

## 2020-09-22 NOTE — TELEPHONE ENCOUNTER
patient wants to know, when was her last tetnus shot, if she had one here. A gate fell on her leg  No future appointments.

## 2020-09-22 NOTE — PROGRESS NOTES
HPI:    Patient ID: Nic Valle is a 56year old female. HPI     Patient is present for recheck on right ear pain. States that she had the pain and treated it.   Basically has not really having any symptoms, but is flying to [place] to visit h Oral Cap Take 1 capsule by mouth daily. Allergies:No Known Allergies   PHYSICAL EXAM:      09/22/20  0846   BP: 112/68   Pulse: 75   Resp: 16   Temp: 97.3 °F (36.3 °C)   TempSrc:  Other   SpO2: 97%   Weight: 242 lb 6.4 oz (110 kg)   Height: 64.25\"

## 2020-09-22 NOTE — PATIENT INSTRUCTIONS
Infection resolved. Take antihistamine such as Claritin, Allegra, or Zyrtec daily for at least the next 2 weeks. Follow-up as needed.

## 2020-09-23 ENCOUNTER — NURSE ONLY (OUTPATIENT)
Dept: FAMILY MEDICINE CLINIC | Facility: CLINIC | Age: 60
End: 2020-09-23
Payer: COMMERCIAL

## 2020-09-23 DIAGNOSIS — Z23 NEED FOR VACCINATION: ICD-10-CM

## 2020-09-23 PROCEDURE — 90471 IMMUNIZATION ADMIN: CPT | Performed by: FAMILY MEDICINE

## 2020-09-23 PROCEDURE — 90715 TDAP VACCINE 7 YRS/> IM: CPT | Performed by: NURSE PRACTITIONER

## 2020-09-23 NOTE — PROGRESS NOTES
Patient here for Tdap vaccination as ordered by Dr. Gemini Leger  Patient denies previous vaccine allergies or acute reactions    Tdap given left deltoid  Well tolerated by patient     Vaccine information sheet given to patient.

## 2021-01-18 RX ORDER — LISINOPRIL 10 MG/1
TABLET ORAL
Qty: 90 TABLET | Refills: 1 | Status: SHIPPED | OUTPATIENT
Start: 2021-01-18 | End: 2021-07-15

## 2021-01-18 RX ORDER — METOPROLOL SUCCINATE 50 MG/1
TABLET, EXTENDED RELEASE ORAL
Qty: 90 TABLET | Refills: 1 | Status: SHIPPED | OUTPATIENT
Start: 2021-01-18 | End: 2021-07-15

## 2021-01-18 NOTE — TELEPHONE ENCOUNTER
Pt is due for med ck F/U. Called and scheduled appt for 2/6/21.   Future Appointments   Date Time Provider Carmen Barrera   2/6/2021  8:20 Andrew Huerta MD [facility] [place] [facility] [place]         Future appt:    Last Appointment with provider:   9/12/2020(

## 2021-02-06 ENCOUNTER — LAB ENCOUNTER (OUTPATIENT)
Dept: LAB | Age: 61
End: 2021-02-06
Attending: FAMILY MEDICINE

## 2021-02-06 ENCOUNTER — OFFICE VISIT (OUTPATIENT)
Dept: FAMILY MEDICINE CLINIC | Facility: CLINIC | Age: 61
End: 2021-02-06
Payer: COMMERCIAL

## 2021-02-06 VITALS
WEIGHT: 252.38 LBS | DIASTOLIC BLOOD PRESSURE: 76 MMHG | HEIGHT: 64.25 IN | HEART RATE: 90 BPM | SYSTOLIC BLOOD PRESSURE: 132 MMHG | TEMPERATURE: 97 F | RESPIRATION RATE: 16 BRPM | BODY MASS INDEX: 43.09 KG/M2 | OXYGEN SATURATION: 97 %

## 2021-02-06 DIAGNOSIS — M19.90 ARTHRITIS: ICD-10-CM

## 2021-02-06 DIAGNOSIS — N85.2 HYPERTROPHY OF UTERUS: ICD-10-CM

## 2021-02-06 DIAGNOSIS — E78.49 FAMILIAL MULTIPLE LIPOPROTEIN-TYPE HYPERLIPIDEMIA: ICD-10-CM

## 2021-02-06 DIAGNOSIS — I10 ESSENTIAL HYPERTENSION: ICD-10-CM

## 2021-02-06 DIAGNOSIS — E55.9 VITAMIN D DEFICIENCY: ICD-10-CM

## 2021-02-06 DIAGNOSIS — I10 ESSENTIAL HYPERTENSION: Primary | ICD-10-CM

## 2021-02-06 LAB
ALBUMIN SERPL-MCNC: 3.6 G/DL (ref 3.4–5)
ALBUMIN/GLOB SERPL: 1 {RATIO} (ref 1–2)
ALP LIVER SERPL-CCNC: 93 U/L
ALT SERPL-CCNC: 20 U/L
ANION GAP SERPL CALC-SCNC: 6 MMOL/L (ref 0–18)
AST SERPL-CCNC: 15 U/L (ref 15–37)
BASOPHILS # BLD AUTO: 0.02 X10(3) UL (ref 0–0.2)
BASOPHILS NFR BLD AUTO: 0.4 %
BILIRUB SERPL-MCNC: 0.4 MG/DL (ref 0.1–2)
BILIRUB UR QL STRIP.AUTO: NEGATIVE
BUN BLD-MCNC: 19 MG/DL (ref 7–18)
BUN/CREAT SERPL: 28.4 (ref 10–20)
CALCIUM BLD-MCNC: 8.9 MG/DL (ref 8.5–10.1)
CHLORIDE SERPL-SCNC: 109 MMOL/L (ref 98–112)
CHOLEST SMN-MCNC: 211 MG/DL (ref ?–200)
CK SERPL-CCNC: 64 U/L
CLARITY UR REFRACT.AUTO: CLEAR
CO2 SERPL-SCNC: 25 MMOL/L (ref 21–32)
CREAT BLD-MCNC: 0.67 MG/DL
DEPRECATED RDW RBC AUTO: 43.1 FL (ref 35.1–46.3)
EOSINOPHIL # BLD AUTO: 0.15 X10(3) UL (ref 0–0.7)
EOSINOPHIL NFR BLD AUTO: 3.2 %
ERYTHROCYTE [DISTWIDTH] IN BLOOD BY AUTOMATED COUNT: 12.8 % (ref 11–15)
GLOBULIN PLAS-MCNC: 3.5 G/DL (ref 2.8–4.4)
GLUCOSE BLD-MCNC: 85 MG/DL (ref 70–99)
GLUCOSE UR STRIP.AUTO-MCNC: NEGATIVE MG/DL
HCT VFR BLD AUTO: 41.6 %
HDLC SERPL-MCNC: 62 MG/DL (ref 40–59)
HGB BLD-MCNC: 13.5 G/DL
IMM GRANULOCYTES # BLD AUTO: 0.01 X10(3) UL (ref 0–1)
IMM GRANULOCYTES NFR BLD: 0.2 %
KETONES UR STRIP.AUTO-MCNC: NEGATIVE MG/DL
LDLC SERPL CALC-MCNC: 133 MG/DL (ref ?–100)
LEUKOCYTE ESTERASE UR QL STRIP.AUTO: NEGATIVE
LYMPHOCYTES # BLD AUTO: 1.64 X10(3) UL (ref 1–4)
LYMPHOCYTES NFR BLD AUTO: 34.6 %
M PROTEIN MFR SERPL ELPH: 7.1 G/DL (ref 6.4–8.2)
MCH RBC QN AUTO: 29.7 PG (ref 26–34)
MCHC RBC AUTO-ENTMCNC: 32.5 G/DL (ref 31–37)
MCV RBC AUTO: 91.4 FL
MONOCYTES # BLD AUTO: 0.32 X10(3) UL (ref 0.1–1)
MONOCYTES NFR BLD AUTO: 6.8 %
NEUTROPHILS # BLD AUTO: 2.6 X10 (3) UL (ref 1.5–7.7)
NEUTROPHILS # BLD AUTO: 2.6 X10(3) UL (ref 1.5–7.7)
NEUTROPHILS NFR BLD AUTO: 54.8 %
NITRITE UR QL STRIP.AUTO: NEGATIVE
NONHDLC SERPL-MCNC: 149 MG/DL (ref ?–130)
OSMOLALITY SERPL CALC.SUM OF ELEC: 292 MOSM/KG (ref 275–295)
PATIENT FASTING Y/N/NP: YES
PATIENT FASTING Y/N/NP: YES
PH UR STRIP.AUTO: 7 [PH] (ref 4.5–8)
PLATELET # BLD AUTO: 278 10(3)UL (ref 150–450)
POTASSIUM SERPL-SCNC: 4.2 MMOL/L (ref 3.5–5.1)
PROT UR STRIP.AUTO-MCNC: NEGATIVE MG/DL
RBC # BLD AUTO: 4.55 X10(6)UL
RBC UR QL AUTO: NEGATIVE
SODIUM SERPL-SCNC: 140 MMOL/L (ref 136–145)
SP GR UR STRIP.AUTO: 1.01 (ref 1–1.03)
TRIGL SERPL-MCNC: 80 MG/DL (ref 30–149)
TSI SER-ACNC: 1.74 MIU/ML (ref 0.36–3.74)
URATE SERPL-MCNC: 4.8 MG/DL
UROBILINOGEN UR STRIP.AUTO-MCNC: <2 MG/DL
VLDLC SERPL CALC-MCNC: 16 MG/DL (ref 0–30)
WBC # BLD AUTO: 4.7 X10(3) UL (ref 4–11)

## 2021-02-06 PROCEDURE — 82550 ASSAY OF CK (CPK): CPT | Performed by: FAMILY MEDICINE

## 2021-02-06 PROCEDURE — 36415 COLL VENOUS BLD VENIPUNCTURE: CPT | Performed by: FAMILY MEDICINE

## 2021-02-06 PROCEDURE — 3078F DIAST BP <80 MM HG: CPT | Performed by: FAMILY MEDICINE

## 2021-02-06 PROCEDURE — 81003 URINALYSIS AUTO W/O SCOPE: CPT | Performed by: FAMILY MEDICINE

## 2021-02-06 PROCEDURE — 3008F BODY MASS INDEX DOCD: CPT | Performed by: FAMILY MEDICINE

## 2021-02-06 PROCEDURE — 80061 LIPID PANEL: CPT | Performed by: FAMILY MEDICINE

## 2021-02-06 PROCEDURE — 99214 OFFICE O/P EST MOD 30 MIN: CPT | Performed by: FAMILY MEDICINE

## 2021-02-06 PROCEDURE — 84550 ASSAY OF BLOOD/URIC ACID: CPT | Performed by: FAMILY MEDICINE

## 2021-02-06 PROCEDURE — 80050 GENERAL HEALTH PANEL: CPT | Performed by: FAMILY MEDICINE

## 2021-02-06 PROCEDURE — 3075F SYST BP GE 130 - 139MM HG: CPT | Performed by: FAMILY MEDICINE

## 2021-02-06 RX ORDER — MELOXICAM 7.5 MG/1
7.5 TABLET ORAL DAILY
Qty: 30 TABLET | Refills: 3 | Status: SHIPPED | OUTPATIENT
Start: 2021-02-06 | End: 2021-10-02

## 2021-02-06 NOTE — PROGRESS NOTES
[facility]  PROGRESS NOTE        HPI:   This is a 56year old female coming in for Patient presents with:  Hypertension: 6 month bp check    HPI overall feeling well. Has gone back to work.    She is taking all her medications regularly Clarity Urine Clear Clear    Spec Gravity 1.012 1.001 - 1.030    Glucose Urine Negative Negative mg/dl    Bilirubin Urine Negative Negative    Ketones Urine Negative Negative mg/dL    Blood Urine Negative Negative    pH Urine 7.0 4.5 - 8.0    Protein Urine [id number]                                  Authorizing Provider:  Israel Olvera MD          Collected:           06/20/2020 08:40 AM          Ordering Location:     [place] [address],      Received:            06/22/2020 12:57 PM No    Family History:  Family History   Problem Relation Age of Onset   • Cancer Father    • Cancer Maternal Grandmother    • Cancer Brother      Allergies:  No Known Allergies  Current Meds:  Current Outpatient Medications   Medication Sig Dispense Refill Negative. Cardiovascular: Negative. Gastrointestinal: Negative. Endocrine: Negative. Genitourinary: Negative. Musculoskeletal: Positive for arthralgias, back pain, gait problem and myalgias. Skin: Negative.     Allergic/Immunologic: Tricia Cameron place, and time. Skin: Skin is warm and dry. She is not diaphoretic. Psychiatric: She has a normal mood and affect. Her behavior is normal. Judgment and thought content normal.       ASSESSMENT AND PLAN:   Ben Ingram was seen today for hypertension.     [place] [id number] • DTAP 08/19/2010   • FLUZONE 6 months and older PFS 0.5 ml (09601) 10/04/2019   • Influenza 10/13/2020   • TDAP 09/23/2020       Most Recent Immunizations  Administered            Date(s) Administered    DTAP                  08/19/2010      FLUZONE 6 m

## 2021-02-08 ENCOUNTER — TELEPHONE (OUTPATIENT)
Dept: FAMILY MEDICINE CLINIC | Facility: CLINIC | Age: 61
End: 2021-02-08

## 2021-02-08 NOTE — TELEPHONE ENCOUNTER
----- Message from Mary Anne Vicente MD sent at 2/8/2021  9:21 AM CST -----  Cholesterol is slowly getting better but not to goal.   Continue present managment

## 2021-07-15 RX ORDER — LISINOPRIL 10 MG/1
TABLET ORAL
Qty: 90 TABLET | Refills: 1 | Status: SHIPPED | OUTPATIENT
Start: 2021-07-15 | End: 2021-10-02

## 2021-07-15 RX ORDER — METOPROLOL SUCCINATE 50 MG/1
TABLET, EXTENDED RELEASE ORAL
Qty: 90 TABLET | Refills: 1 | Status: SHIPPED | OUTPATIENT
Start: 2021-07-15 | End: 2021-10-02

## 2021-07-15 NOTE — TELEPHONE ENCOUNTER
Future appt:     Your appointments     Date & Time Appointment Department [facility])    Aug 07, 2021  8:20 AM CDT Follow Up Visit with Jace Short MD [facility], [facility] ([place]) [facility]

## 2021-10-02 ENCOUNTER — OFFICE VISIT (OUTPATIENT)
Dept: FAMILY MEDICINE CLINIC | Facility: CLINIC | Age: 61
End: 2021-10-02
Payer: COMMERCIAL

## 2021-10-02 ENCOUNTER — LAB ENCOUNTER (OUTPATIENT)
Dept: LAB | Age: 61
End: 2021-10-02
Attending: FAMILY MEDICINE
Payer: COMMERCIAL

## 2021-10-02 VITALS
WEIGHT: 262 LBS | OXYGEN SATURATION: 94 % | TEMPERATURE: 97 F | SYSTOLIC BLOOD PRESSURE: 118 MMHG | HEART RATE: 100 BPM | DIASTOLIC BLOOD PRESSURE: 78 MMHG | HEIGHT: 65.5 IN | RESPIRATION RATE: 18 BRPM | BODY MASS INDEX: 43.13 KG/M2

## 2021-10-02 DIAGNOSIS — E78.49 FAMILIAL MULTIPLE LIPOPROTEIN-TYPE HYPERLIPIDEMIA: ICD-10-CM

## 2021-10-02 DIAGNOSIS — I10 ESSENTIAL HYPERTENSION: ICD-10-CM

## 2021-10-02 DIAGNOSIS — Z00.00 WELLNESS EXAMINATION: Primary | ICD-10-CM

## 2021-10-02 PROCEDURE — 3078F DIAST BP <80 MM HG: CPT | Performed by: FAMILY MEDICINE

## 2021-10-02 PROCEDURE — 90736 HZV VACCINE LIVE SUBQ: CPT | Performed by: FAMILY MEDICINE

## 2021-10-02 PROCEDURE — 3074F SYST BP LT 130 MM HG: CPT | Performed by: NURSE PRACTITIONER

## 2021-10-02 PROCEDURE — 90471 IMMUNIZATION ADMIN: CPT | Performed by: FAMILY MEDICINE

## 2021-10-02 PROCEDURE — 81003 URINALYSIS AUTO W/O SCOPE: CPT | Performed by: FAMILY MEDICINE

## 2021-10-02 PROCEDURE — 82550 ASSAY OF CK (CPK): CPT | Performed by: FAMILY MEDICINE

## 2021-10-02 PROCEDURE — 99396 PREV VISIT EST AGE 40-64: CPT | Performed by: FAMILY MEDICINE

## 2021-10-02 PROCEDURE — 3008F BODY MASS INDEX DOCD: CPT | Performed by: FAMILY MEDICINE

## 2021-10-02 PROCEDURE — 80053 COMPREHEN METABOLIC PANEL: CPT | Performed by: FAMILY MEDICINE

## 2021-10-02 PROCEDURE — 80061 LIPID PANEL: CPT | Performed by: FAMILY MEDICINE

## 2021-10-02 PROCEDURE — 3079F DIAST BP 80-89 MM HG: CPT | Performed by: NURSE PRACTITIONER

## 2021-10-02 PROCEDURE — 3074F SYST BP LT 130 MM HG: CPT | Performed by: FAMILY MEDICINE

## 2021-10-02 RX ORDER — LISINOPRIL 10 MG/1
10 TABLET ORAL DAILY
Qty: 90 TABLET | Refills: 1 | Status: SHIPPED | OUTPATIENT
Start: 2021-10-02

## 2021-10-02 RX ORDER — METOPROLOL SUCCINATE 50 MG/1
50 TABLET, EXTENDED RELEASE ORAL DAILY
Qty: 90 TABLET | Refills: 1 | Status: SHIPPED | OUTPATIENT
Start: 2021-10-02

## 2021-10-02 RX ORDER — MELOXICAM 7.5 MG/1
7.5 TABLET ORAL DAILY
Qty: 90 TABLET | Refills: 1 | Status: SHIPPED | OUTPATIENT
Start: 2021-10-02

## 2021-10-02 NOTE — PROGRESS NOTES
HPI:   Yoli Mandujano is a 59year old female who presents for an Annual Health Visit. Overall doing well.        Allergies:   No Known Allergies    CURRENT MEDICATIONS   Current Outpatient Medications   Medication Sig Dispense Refill   • [employer] daily        REVIEW OF SYSTEMS:     Review of Systems   Constitutional: Negative. HENT: Negative. Eyes: Negative. Respiratory: Negative. Cardiovascular: Negative. Gastrointestinal: Negative. Endocrine: Negative.     Genitourinary: Skin:     General: Skin is warm and dry. Capillary Refill: Capillary refill takes 2 to 3 seconds. Neurological:      General: No focal deficit present. Mental Status: She is alert and oriented to person, place, and time.    Psychiatric: Hypertrophy of uterus     Varicose veins of lower extremities with inflammation     Vitamin D deficiency     Arthritis      Maximiliano Fabian MD  10/2/2021  8:23 AM

## 2021-10-04 ENCOUNTER — TELEPHONE (OUTPATIENT)
Dept: FAMILY MEDICINE CLINIC | Facility: CLINIC | Age: 61
End: 2021-10-04

## 2021-10-04 NOTE — TELEPHONE ENCOUNTER
----- Message from Wellington Sexton MD sent at 10/4/2021  9:49 AM CDT -----  Better attention to diet.    The 10-year ASCVD risk score (Brittany Marin., et al., 2013) is: 5.3%  Cholesterol went the wrong way, but her overall risk score is still normal.   Previousl

## 2021-10-08 ENCOUNTER — TELEPHONE (OUTPATIENT)
Dept: FAMILY MEDICINE CLINIC | Facility: CLINIC | Age: 61
End: 2021-10-08

## 2021-10-08 NOTE — TELEPHONE ENCOUNTER
Patient has been experiencing brief episodes (several seconds) of what feels like a fluttering in her chest, resembles indigestion. She already has a lot of burping and gassiness, and this is something separate.  Denies any other symptoms, including dizzine Transaminitis Decubitus ulcer of sacral region, stage 4

## 2021-10-08 NOTE — TELEPHONE ENCOUNTER
No appointments today or tomorrow. Agree with recommendation for urgent care or ER. Recommend an appointment for next week for further work-up.

## 2021-10-08 NOTE — TELEPHONE ENCOUNTER
Future Appointments   Date Time Provider Carmen Barrera   10/11/2021  4:00 PM ALEN Whitney [facility] [place]

## 2021-11-15 ENCOUNTER — OFFICE VISIT (OUTPATIENT)
Dept: FAMILY MEDICINE CLINIC | Facility: CLINIC | Age: 61
End: 2021-11-15
Payer: COMMERCIAL

## 2021-11-15 VITALS
OXYGEN SATURATION: 98 % | TEMPERATURE: 97 F | RESPIRATION RATE: 18 BRPM | HEART RATE: 92 BPM | WEIGHT: 260 LBS | SYSTOLIC BLOOD PRESSURE: 128 MMHG | BODY MASS INDEX: 43 KG/M2 | DIASTOLIC BLOOD PRESSURE: 78 MMHG

## 2021-11-15 DIAGNOSIS — K64.9 HEMORRHOIDS, UNSPECIFIED HEMORRHOID TYPE: Primary | ICD-10-CM

## 2021-11-15 PROCEDURE — 99213 OFFICE O/P EST LOW 20 MIN: CPT | Performed by: NURSE PRACTITIONER

## 2021-11-15 PROCEDURE — 3074F SYST BP LT 130 MM HG: CPT | Performed by: NURSE PRACTITIONER

## 2021-11-15 PROCEDURE — 3078F DIAST BP <80 MM HG: CPT | Performed by: NURSE PRACTITIONER

## 2021-11-15 RX ORDER — HYDROCORTISONE 25 MG/G
1 CREAM TOPICAL 2 TIMES DAILY
Qty: 28 G | Refills: 0 | Status: SHIPPED | OUTPATIENT
Start: 2021-11-15 | End: 2021-11-29

## 2021-11-15 RX ORDER — MELOXICAM 7.5 MG/1
7.5 TABLET ORAL AS NEEDED
COMMUNITY

## 2021-11-15 NOTE — PATIENT INSTRUCTIONS
Apply topical hemorrhoid cream twice a day to rectum. Sitz baths twice a day. If no improvement or worsening then notify the office, will place referral for general surgery.       Treating Hemorrhoids: Self-Care  Follow your healthcare provider’s advice a psyllium. This is a high-fiber supplement available at most grocery stores and drugstores. Eating more fiber-rich foods will also help. There are two types of fiber:   · Insoluble fiber is the main ingredient in bulking agents.  It’s also found in foods suc sliced banana. Or, try peanut butter on whole-wheat toast.  · Eat carrot sticks for snacks. They’re easy to prepare, taste great, and are low in calories. · Use whole-grain breads instead of white bread for sandwiches. · Eat fruits for treats.  Try an deirdre

## 2021-11-16 NOTE — PROGRESS NOTES
Patient ID:   Renan Floyd  is a 59year old female    Allergies  No Known Allergies  Medications   Meloxicam 7.5 MG Oral Tab, Take 7.5 mg by mouth as needed. , Disp: , Rfl:   hydrocortisone (ANUSOL-HC) 2.5 % External Cream, Place 1 Application recta HPI  GASTROINTESTINAL: Denies nausea, vomiting, diarrhea, or constipation   CARDIOVASCULAR: denies complaints   SKIN: Denies rashes or lesions  LYMPHATIC: denies inguinal lymphadenopathy        PHYSICAL EXAM:    Physical Exam   /78 (BP Location: Left a few inches of warm bath water. Soaking for 10 minutes twice a day can provide relief from painful hemorrhoids. It can also help the area stay clean. · Develop good bowel habits. Use the bathroom when you need to.  Don’t ignore the urge to move your bowel Drinking fruit juices, such as prune juice or apple juice, can also help prevent constipation. Get more exercise  Regular exercise aids digestion and helps prevent constipation. It’s also great for your health.  So talk with your healthcare provider about Visit:  Requested Prescriptions     Signed Prescriptions Disp Refills   • hydrocortisone (ANUSOL-HC) 2.5 % External Cream 28 g 0     Sig: Place 1 Application rectally 2 (two) times daily for 14 days.        Imaging & Consults:  None    This note was created

## 2021-11-30 ENCOUNTER — TELEPHONE (OUTPATIENT)
Dept: FAMILY MEDICINE CLINIC | Facility: CLINIC | Age: 61
End: 2021-11-30

## 2021-11-30 NOTE — TELEPHONE ENCOUNTER
Dr. Lopez Cifuentes is out of the office today. Please make sure the patient knows that her mammogram is normal.  Repeat in 1 year. Thank you.

## 2022-03-11 ENCOUNTER — TELEPHONE (OUTPATIENT)
Dept: FAMILY MEDICINE CLINIC | Facility: CLINIC | Age: 62
End: 2022-03-11

## 2022-03-11 NOTE — TELEPHONE ENCOUNTER
See's Dr. Pillo Rolon, had to re-schedule appt. , normally she comes in also for labs, needs an order.   Future Appointments   Date Time Provider Carmen Barrera   4/11/2022  4:00 PM Maddy Martino, ALEN [facility] [place] [facility]

## 2022-04-08 RX ORDER — MELOXICAM 7.5 MG/1
TABLET ORAL
Qty: 90 TABLET | Refills: 1 | Status: SHIPPED | OUTPATIENT
Start: 2022-04-08 | End: 2022-09-26

## 2022-04-08 NOTE — TELEPHONE ENCOUNTER
Future appt: Your appointments     Date & Time Appointment Department [facility])    Apr 09, 2022 10:45 AM CDT Laboratory Visit with REF Haley Ying Reference Lab ([facility] Ref [facility] [place])        Apr 11, 2022  4:00 PM CDT Exam - Established with Shirley Vences, [address] [place] [address] ([facility]            25 [facility]  [place] [address] [phone]  [facility] [facility]  [place] [address] [address]  [phone]        Last Appointment with provider:  10/2/21 for annual physical.  [facility] appointment at [facility]:  11/15/2021 with  for hemorrhoids. Cholesterol, Total (mg/dL)   Date Value   10/02/2021 223 (H)     HDL Cholesterol (mg/dL)   Date Value   10/02/2021 60 (H)     LDL Cholesterol (mg/dL)   Date Value   10/02/2021 147 (H)     Triglycerides (mg/dL)   Date Value   10/02/2021 89     No results found for: EAG, A1C  Lab Results   Component Value Date    TSH 1.740 02/06/2021       No follow-ups on file.

## 2022-04-09 ENCOUNTER — LABORATORY ENCOUNTER (OUTPATIENT)
Dept: LAB | Age: 62
End: 2022-04-09
Attending: FAMILY MEDICINE
Payer: COMMERCIAL

## 2022-04-09 DIAGNOSIS — E78.49 FAMILIAL MULTIPLE LIPOPROTEIN-TYPE HYPERLIPIDEMIA: ICD-10-CM

## 2022-04-09 DIAGNOSIS — I10 ESSENTIAL HYPERTENSION: ICD-10-CM

## 2022-04-09 LAB
ALBUMIN SERPL-MCNC: 3.7 G/DL (ref 3.4–5)
ALBUMIN/GLOB SERPL: 1.1 {RATIO} (ref 1–2)
ALP LIVER SERPL-CCNC: 88 U/L
ALT SERPL-CCNC: 17 U/L
ANION GAP SERPL CALC-SCNC: 3 MMOL/L (ref 0–18)
AST SERPL-CCNC: 13 U/L (ref 15–37)
BILIRUB SERPL-MCNC: 0.5 MG/DL (ref 0.1–2)
BUN BLD-MCNC: 20 MG/DL (ref 7–18)
CALCIUM BLD-MCNC: 9.3 MG/DL (ref 8.5–10.1)
CHLORIDE SERPL-SCNC: 109 MMOL/L (ref 98–112)
CHOLEST SERPL-MCNC: 207 MG/DL (ref ?–200)
CK SERPL-CCNC: 61 U/L
CO2 SERPL-SCNC: 28 MMOL/L (ref 21–32)
CREAT BLD-MCNC: 0.78 MG/DL
FASTING PATIENT LIPID ANSWER: YES
FASTING STATUS PATIENT QL REPORTED: YES
GLOBULIN PLAS-MCNC: 3.3 G/DL (ref 2.8–4.4)
GLUCOSE BLD-MCNC: 95 MG/DL (ref 70–99)
HDLC SERPL-MCNC: 60 MG/DL (ref 40–59)
LDLC SERPL CALC-MCNC: 127 MG/DL (ref ?–100)
NONHDLC SERPL-MCNC: 147 MG/DL (ref ?–130)
OSMOLALITY SERPL CALC.SUM OF ELEC: 292 MOSM/KG (ref 275–295)
POTASSIUM SERPL-SCNC: 4.2 MMOL/L (ref 3.5–5.1)
PROT SERPL-MCNC: 7 G/DL (ref 6.4–8.2)
SODIUM SERPL-SCNC: 140 MMOL/L (ref 136–145)
TRIGL SERPL-MCNC: 112 MG/DL (ref 30–149)
VLDLC SERPL CALC-MCNC: 20 MG/DL (ref 0–30)

## 2022-04-09 PROCEDURE — 80061 LIPID PANEL: CPT

## 2022-04-09 PROCEDURE — 82550 ASSAY OF CK (CPK): CPT

## 2022-04-09 PROCEDURE — 36415 COLL VENOUS BLD VENIPUNCTURE: CPT

## 2022-04-09 PROCEDURE — 80053 COMPREHEN METABOLIC PANEL: CPT

## 2022-04-11 ENCOUNTER — TELEPHONE (OUTPATIENT)
Dept: FAMILY MEDICINE CLINIC | Facility: CLINIC | Age: 62
End: 2022-04-11

## 2022-04-11 NOTE — TELEPHONE ENCOUNTER
----- Message from ALEN Singh sent at 4/9/2022  7:23 PM CDT -----  Labs stable with improving cholesterol levels. Continue with diet and aerobic exercise. Recheck in 6 months. Note to MyChart.

## 2022-04-11 NOTE — TELEPHONE ENCOUNTER
Future appt: Your appointments     Date & Time Appointment Department [facility])    Apr 27, 2022  3:30 PM CDT Exam - Established with Santhosh Soto, [facility] ([facility])            [address] [place] [phone]  [phone]        Last Appointment with provider:   10/2/21 physical  Last appointment at [facility] [place]:  11/15/2021  Cholesterol, Total (mg/dL)   Date Value   04/09/2022 207 (H)     HDL Cholesterol (mg/dL)   Date Value   04/09/2022 60 (H)     LDL Cholesterol (mg/dL)   Date Value   04/09/2022 127 (H)     Triglycerides (mg/dL)   Date Value   04/09/2022 112     No results found for: EAG, A1C  Lab Results   Component Value Date    TSH 1.740 02/06/2021       No follow-ups on file.

## 2022-04-11 NOTE — TELEPHONE ENCOUNTER
Future appt: Your appointments     Date & Time Appointment Department [facility])    Apr 27, 2022  3:30 PM CDT Exam - Established with Reddy Rodriguez, [facility] [facility], [facility] ([place])            [address] [place] [address] [facility] [address] [phone]  [phone]        Last Appointment with provider:   10/2/21 physical  [facility]:  11/15/2021  Cholesterol, Total (mg/dL)   Date Value   04/09/2022 207 (H)     HDL Cholesterol (mg/dL)   Date Value   04/09/2022 60 (H)     LDL Cholesterol (mg/dL)   Date Value   04/09/2022 127 (H)     Triglycerides (mg/dL)   Date Value   04/09/2022 112     No results found for: EAG, A1C  Lab Results   Component Value Date    TSH 1.740 02/06/2021       No follow-ups on file.

## 2022-04-12 RX ORDER — METOPROLOL SUCCINATE 50 MG/1
TABLET, EXTENDED RELEASE ORAL
Qty: 90 TABLET | Refills: 0 | Status: SHIPPED | OUTPATIENT
Start: 2022-04-12

## 2022-04-12 RX ORDER — LISINOPRIL 10 MG/1
TABLET ORAL
Qty: 90 TABLET | Refills: 0 | Status: SHIPPED | OUTPATIENT
Start: 2022-04-12

## 2022-04-27 ENCOUNTER — TELEPHONE (OUTPATIENT)
Dept: FAMILY MEDICINE CLINIC | Facility: CLINIC | Age: 62
End: 2022-04-27

## 2022-04-27 NOTE — TELEPHONE ENCOUNTER
pt missed appt today with laurel- stated she thought it was at 4pm, her appt was at 3:30pm - made appt for her px in october instead of rescheduling her 6 mo follow up- needs orders for labs as she wanted her appt for labs before seeing laurel for px

## 2022-07-11 RX ORDER — METOPROLOL SUCCINATE 50 MG/1
TABLET, EXTENDED RELEASE ORAL
Qty: 90 TABLET | Refills: 1 | Status: SHIPPED | OUTPATIENT
Start: 2022-07-11

## 2022-07-11 RX ORDER — LISINOPRIL 10 MG/1
TABLET ORAL
Qty: 90 TABLET | Refills: 0 | Status: SHIPPED | OUTPATIENT
Start: 2022-07-11

## 2022-07-11 NOTE — TELEPHONE ENCOUNTER
Future appt: Your appointments     Date & Time Appointment Department [facility])    Oct 08, 2022  8:30 AM CDT Laboratory Visit with REF Johnson Mcginnis Reference Lab ([facility] Ref [facility] [place])        Oct 08, 2022  8:45 AM CDT Physical - Established with Tish Merino, [address] [place] [address] ([facility])            [facility]  [place] [address] [phone]  [facility] E [facility] Reference Lab  EDW Ref Lab [place]  [place] [phone]  [phone]        Last Appointment with provider:   Last physical 10/2/21  Last appointment [facility]:  4/27/2022  Cholesterol, Total (mg/dL)   Date Value   04/09/2022 207 (H)     HDL Cholesterol (mg/dL)   Date Value   04/09/2022 60 (H)     LDL Cholesterol (mg/dL)   Date Value   04/09/2022 127 (H)     Triglycerides (mg/dL)   Date Value   04/09/2022 112     No results found for: EAG, A1C  Lab Results   Component Value Date    TSH 1.740 02/06/2021       No follow-ups on file.

## 2022-09-26 RX ORDER — MELOXICAM 7.5 MG/1
TABLET ORAL
Qty: 90 TABLET | Refills: 1 | Status: SHIPPED | OUTPATIENT
Start: 2022-09-26

## 2022-09-26 NOTE — TELEPHONE ENCOUNTER
Future appt: Your appointments     Date & Time Appointment Department [facility])    Oct 08, 2022  8:30 AM CDT Laboratory Visit with REF [place] Reference Lab (EDW Ref Lab [place])        Oct 12, 2022  2:15 PM CDT Physical - Established with Rafael Gaston [address] [place] [address] [place] ([facility])            25 [facility]  [place] [address] [phone]  [facility] E [facility] Reference Lab  EDW Ref Lab [place]  [place] [facility] [address]  [phone]        Last Appointment with provider:  10/2/21 for annual physical.  Last appointment [facility] [place]:  4/27/2022  Cholesterol, Total (mg/dL)   Date Value   04/09/2022 207 (H)     HDL Cholesterol (mg/dL)   Date Value   04/09/2022 60 (H)     LDL Cholesterol (mg/dL)   Date Value   04/09/2022 127 (H)     Triglycerides (mg/dL)   Date Value   04/09/2022 112     No results found for: EAG, A1C  Lab Results   Component Value Date    TSH 1.740 02/06/2021       No follow-ups on file.
Number Of Freeze-Thaw Cycles: 2 freeze-thaw cycles
Render Post-Care Instructions In Note?: no
Consent: The patient's consent was obtained including but not limited to risks of crusting, scabbing, blistering, scarring, darker or lighter pigmentary change, recurrence, incomplete removal and infection.
Detail Level: Simple
Duration Of Freeze Thaw-Cycle (Seconds): 10
Post-Care Instructions: I reviewed with the patient in detail post-care instructions. Patient is to wear sunprotection, and avoid picking at any of the treated lesions. Pt may apply Vaseline to crusted or scabbing areas.

## 2022-10-08 ENCOUNTER — LABORATORY ENCOUNTER (OUTPATIENT)
Dept: LAB | Age: 62
End: 2022-10-08
Attending: NURSE PRACTITIONER
Payer: COMMERCIAL

## 2022-10-08 DIAGNOSIS — E55.9 VITAMIN D DEFICIENCY: ICD-10-CM

## 2022-10-08 DIAGNOSIS — E78.49 FAMILIAL MULTIPLE LIPOPROTEIN-TYPE HYPERLIPIDEMIA: ICD-10-CM

## 2022-10-08 DIAGNOSIS — Z00.00 WELLNESS EXAMINATION: ICD-10-CM

## 2022-10-08 LAB
ALBUMIN SERPL-MCNC: 3.5 G/DL (ref 3.4–5)
ALBUMIN/GLOB SERPL: 0.9 {RATIO} (ref 1–2)
ALP LIVER SERPL-CCNC: 85 U/L
ALT SERPL-CCNC: 17 U/L
ANION GAP SERPL CALC-SCNC: 4 MMOL/L (ref 0–18)
AST SERPL-CCNC: 11 U/L (ref 15–37)
BASOPHILS # BLD AUTO: 0.02 X10(3) UL (ref 0–0.2)
BASOPHILS NFR BLD AUTO: 0.4 %
BILIRUB SERPL-MCNC: 0.5 MG/DL (ref 0.1–2)
BUN BLD-MCNC: 17 MG/DL (ref 7–18)
CALCIUM BLD-MCNC: 9 MG/DL (ref 8.5–10.1)
CHLORIDE SERPL-SCNC: 108 MMOL/L (ref 98–112)
CHOLEST SERPL-MCNC: 197 MG/DL (ref ?–200)
CK SERPL-CCNC: 89 U/L
CO2 SERPL-SCNC: 28 MMOL/L (ref 21–32)
CREAT BLD-MCNC: 0.73 MG/DL
EOSINOPHIL # BLD AUTO: 0.14 X10(3) UL (ref 0–0.7)
EOSINOPHIL NFR BLD AUTO: 2.6 %
ERYTHROCYTE [DISTWIDTH] IN BLOOD BY AUTOMATED COUNT: 12.8 %
FASTING PATIENT LIPID ANSWER: YES
FASTING STATUS PATIENT QL REPORTED: YES
GFR SERPLBLD BASED ON 1.73 SQ M-ARVRAT: 92 ML/MIN/1.73M2 (ref 60–?)
GLOBULIN PLAS-MCNC: 3.7 G/DL (ref 2.8–4.4)
GLUCOSE BLD-MCNC: 97 MG/DL (ref 70–99)
HCT VFR BLD AUTO: 42.9 %
HDLC SERPL-MCNC: 66 MG/DL (ref 40–59)
HGB BLD-MCNC: 13.6 G/DL
IMM GRANULOCYTES # BLD AUTO: 0 X10(3) UL (ref 0–1)
IMM GRANULOCYTES NFR BLD: 0 %
LDLC SERPL CALC-MCNC: 112 MG/DL (ref ?–100)
LYMPHOCYTES # BLD AUTO: 1.73 X10(3) UL (ref 1–4)
LYMPHOCYTES NFR BLD AUTO: 31.6 %
MCH RBC QN AUTO: 30 PG (ref 26–34)
MCHC RBC AUTO-ENTMCNC: 31.7 G/DL (ref 31–37)
MCV RBC AUTO: 94.7 FL
MONOCYTES # BLD AUTO: 0.31 X10(3) UL (ref 0.1–1)
MONOCYTES NFR BLD AUTO: 5.7 %
NEUTROPHILS # BLD AUTO: 3.27 X10 (3) UL (ref 1.5–7.7)
NEUTROPHILS # BLD AUTO: 3.27 X10(3) UL (ref 1.5–7.7)
NEUTROPHILS NFR BLD AUTO: 59.7 %
NONHDLC SERPL-MCNC: 131 MG/DL (ref ?–130)
OSMOLALITY SERPL CALC.SUM OF ELEC: 291 MOSM/KG (ref 275–295)
PLATELET # BLD AUTO: 295 10(3)UL (ref 150–450)
POTASSIUM SERPL-SCNC: 4 MMOL/L (ref 3.5–5.1)
PROT SERPL-MCNC: 7.2 G/DL (ref 6.4–8.2)
RBC # BLD AUTO: 4.53 X10(6)UL
SODIUM SERPL-SCNC: 140 MMOL/L (ref 136–145)
TRIGL SERPL-MCNC: 109 MG/DL (ref 30–149)
TSI SER-ACNC: 2.25 MIU/ML (ref 0.36–3.74)
VIT D+METAB SERPL-MCNC: 37.2 NG/ML (ref 30–100)
VLDLC SERPL CALC-MCNC: 19 MG/DL (ref 0–30)
WBC # BLD AUTO: 5.5 X10(3) UL (ref 4–11)

## 2022-10-08 PROCEDURE — 82306 VITAMIN D 25 HYDROXY: CPT | Performed by: NURSE PRACTITIONER

## 2022-10-08 PROCEDURE — 80050 GENERAL HEALTH PANEL: CPT | Performed by: NURSE PRACTITIONER

## 2022-10-08 PROCEDURE — 80061 LIPID PANEL: CPT | Performed by: NURSE PRACTITIONER

## 2022-10-08 PROCEDURE — 82550 ASSAY OF CK (CPK): CPT | Performed by: NURSE PRACTITIONER

## 2022-10-08 RX ORDER — LISINOPRIL 10 MG/1
TABLET ORAL
Qty: 90 TABLET | Refills: 0 | Status: SHIPPED | OUTPATIENT
Start: 2022-10-08

## 2022-10-08 NOTE — TELEPHONE ENCOUNTER
LISINOPRIL 10 MG Oral Tab     #90  R-0       Summary: TAKE 1 TABLET BY MOUTH EVERY DAY,                      Future appt: Your appointments     Date & Time Appointment Department [facility])    Oct 12, 2022  2:15 PM CDT Physical - Established with Amando Delaney, [address] [place] [address])            25 [facility]  [facility] [phone]  [phone]        Last Appointment with provider:   4/27/2022  [facility] appointment [facility]:  4/27/2022  Cholesterol, Total (mg/dL)   Date Value   04/09/2022 207 (H)     HDL Cholesterol (mg/dL)   Date Value   04/09/2022 60 (H)     LDL Cholesterol (mg/dL)   Date Value   04/09/2022 127 (H)     Triglycerides (mg/dL)   Date Value   04/09/2022 112     No results found for: EAG, A1C  Lab Results   Component Value Date    TSH 1.740 02/06/2021       No follow-ups on file.

## 2022-10-12 ENCOUNTER — OFFICE VISIT (OUTPATIENT)
Dept: FAMILY MEDICINE CLINIC | Facility: CLINIC | Age: 62
End: 2022-10-12
Payer: COMMERCIAL

## 2022-10-12 VITALS
OXYGEN SATURATION: 97 % | BODY MASS INDEX: 44.9 KG/M2 | WEIGHT: 263 LBS | TEMPERATURE: 97 F | SYSTOLIC BLOOD PRESSURE: 124 MMHG | HEART RATE: 72 BPM | DIASTOLIC BLOOD PRESSURE: 80 MMHG | RESPIRATION RATE: 18 BRPM | HEIGHT: 64 IN

## 2022-10-12 DIAGNOSIS — Z12.4 SCREENING FOR CERVICAL CANCER: ICD-10-CM

## 2022-10-12 DIAGNOSIS — Z01.419 ENCOUNTER FOR WELL WOMAN EXAM: ICD-10-CM

## 2022-10-12 DIAGNOSIS — Z00.00 WELLNESS EXAMINATION: Primary | ICD-10-CM

## 2022-10-12 DIAGNOSIS — Z12.31 SCREENING MAMMOGRAM FOR BREAST CANCER: ICD-10-CM

## 2022-10-12 LAB

## 2022-10-12 PROCEDURE — 99396 PREV VISIT EST AGE 40-64: CPT | Performed by: NURSE PRACTITIONER

## 2022-10-12 PROCEDURE — 3074F SYST BP LT 130 MM HG: CPT | Performed by: NURSE PRACTITIONER

## 2022-10-12 PROCEDURE — 87624 HPV HI-RISK TYP POOLED RSLT: CPT | Performed by: NURSE PRACTITIONER

## 2022-10-12 PROCEDURE — 81001 URINALYSIS AUTO W/SCOPE: CPT | Performed by: NURSE PRACTITIONER

## 2022-10-12 PROCEDURE — 90750 HZV VACC RECOMBINANT IM: CPT | Performed by: NURSE PRACTITIONER

## 2022-10-12 PROCEDURE — 90471 IMMUNIZATION ADMIN: CPT | Performed by: NURSE PRACTITIONER

## 2022-10-12 NOTE — PATIENT INSTRUCTIONS
Shingrix #2 today    Pap and urine tests pending     Please call [facility] patient scheduling at [phone] for appointment for mammogram in November. Continue current therapy. Follow up in 6 months and as needed.

## 2022-10-13 LAB — HPV I/H RISK 1 DNA SPEC QL NAA+PROBE: NEGATIVE

## 2023-01-04 RX ORDER — METOPROLOL SUCCINATE 50 MG/1
TABLET, EXTENDED RELEASE ORAL
Qty: 90 TABLET | Refills: 1 | Status: SHIPPED | OUTPATIENT
Start: 2023-01-04

## 2023-01-04 NOTE — TELEPHONE ENCOUNTER
Future appt: Your appointments     Date & Time Appointment Department [facility])    Apr 08, 2023  8:15 AM CDT what is this with REF Lidyajacqueline ([address] [facility])        Apr 17, 2023  3:30 PM CDT Follow up - Extended with [address] [facility] [address], [address] [facility], [address] [facility] [address], [address] [place] ([facility])            Dionisio Bhandari Dalton  EDW Ref [facility] [place]  [place] [address] [id number]  [address] [place] [address]  [place] [phone]  [phone]        Last Appointment with provider:   10/12/2022  Last appointment at [facility]:  10/12/2022  Last px: 10/12/2022- recheck in 6 months    Cholesterol, Total (mg/dL)   Date Value   10/08/2022 197     HDL Cholesterol (mg/dL)   Date Value   10/08/2022 66 (H)     LDL Cholesterol (mg/dL)   Date Value   10/08/2022 112 (H)     Triglycerides (mg/dL)   Date Value   10/08/2022 109     No results found for: EAG, A1C  Lab Results   Component Value Date    TSH 2.250 10/08/2022       No follow-ups on file.

## 2023-01-06 RX ORDER — LISINOPRIL 10 MG/1
TABLET ORAL
Qty: 90 TABLET | Refills: 0 | Status: SHIPPED | OUTPATIENT
Start: 2023-01-06

## 2023-01-06 NOTE — TELEPHONE ENCOUNTER
Future appt: Your appointments     Date & Time Appointment Department [facility])    Apr 08, 2023  8:15 AM CDT what is this with REF Ilia ([facility])        Apr 17, 2023  3:30 PM CDT Follow up - Extended with [address] [facility] [address], [address] [facility], [address] [facility] [address], [address] [place] ([place])            Dionisio Vo [place]  [facility]  [place] [phone]  [address] [place] [address]  [place] [address]76 [phone]  [phone]        Last Appointment with provider:   10/12/2022 for annual physical.  [facility] [place]:  10/12/2022  Cholesterol, Total (mg/dL)   Date Value   10/08/2022 197     HDL Cholesterol (mg/dL)   Date Value   10/08/2022 66 (H)     LDL Cholesterol (mg/dL)   Date Value   10/08/2022 112 (H)     Triglycerides (mg/dL)   Date Value   10/08/2022 109     No results found for: EAG, A1C  Lab Results   Component Value Date    TSH 2.250 10/08/2022       No follow-ups on file.

## 2023-01-30 ENCOUNTER — TELEPHONE (OUTPATIENT)
Dept: FAMILY MEDICINE CLINIC | Facility: CLINIC | Age: 63
End: 2023-01-30

## 2023-01-30 NOTE — TELEPHONE ENCOUNTER
Patient has had long history of occasional \"missed beats\" with heart rate. However, over the past 2 weeks, she is noticing more frequent palpitations, especially at night. Not noticing much during the day. No shortness of breath, chest pain or pressure, or dizziness. Scheduled patient for office visit to evaluate. Asked for first available tomorrow. Future Appointments   Date Time Provider Carmen Barrera   1/31/2023 10:15 AM Andrea Martino APRN [facility] [place] [facility]   4/8/2023  8:15 AM REF [facility] [facility]F [facility]   4/19/2023  3:30 PM Andrea Martino APRN [facility] [place] [facility] [place]     Please advise if any further recommendations.

## 2023-01-31 ENCOUNTER — LAB ENCOUNTER (OUTPATIENT)
Dept: LAB | Age: 63
End: 2023-01-31
Attending: FAMILY MEDICINE
Payer: COMMERCIAL

## 2023-01-31 ENCOUNTER — OFFICE VISIT (OUTPATIENT)
Dept: FAMILY MEDICINE CLINIC | Facility: CLINIC | Age: 63
End: 2023-01-31
Payer: COMMERCIAL

## 2023-01-31 VITALS
OXYGEN SATURATION: 98 % | SYSTOLIC BLOOD PRESSURE: 124 MMHG | BODY MASS INDEX: 44.22 KG/M2 | HEART RATE: 71 BPM | DIASTOLIC BLOOD PRESSURE: 84 MMHG | HEIGHT: 64 IN | RESPIRATION RATE: 18 BRPM | WEIGHT: 259 LBS | TEMPERATURE: 98 F

## 2023-01-31 DIAGNOSIS — R00.2 PALPITATIONS: Primary | ICD-10-CM

## 2023-01-31 DIAGNOSIS — G47.33 OBSTRUCTIVE SLEEP APNEA: ICD-10-CM

## 2023-01-31 DIAGNOSIS — R00.2 PALPITATIONS: ICD-10-CM

## 2023-01-31 LAB
ALBUMIN SERPL-MCNC: 3.7 G/DL (ref 3.4–5)
ALBUMIN/GLOB SERPL: 1 {RATIO} (ref 1–2)
ALP LIVER SERPL-CCNC: 79 U/L
ALT SERPL-CCNC: 17 U/L
ANION GAP SERPL CALC-SCNC: 2 MMOL/L (ref 0–18)
AST SERPL-CCNC: 12 U/L (ref 15–37)
BASOPHILS # BLD AUTO: 0.02 X10(3) UL (ref 0–0.2)
BASOPHILS NFR BLD AUTO: 0.3 %
BILIRUB SERPL-MCNC: 0.5 MG/DL (ref 0.1–2)
BUN BLD-MCNC: 19 MG/DL (ref 7–18)
CALCIUM BLD-MCNC: 9.6 MG/DL (ref 8.5–10.1)
CHLORIDE SERPL-SCNC: 108 MMOL/L (ref 98–112)
CO2 SERPL-SCNC: 28 MMOL/L (ref 21–32)
CREAT BLD-MCNC: 0.75 MG/DL
EOSINOPHIL # BLD AUTO: 0.12 X10(3) UL (ref 0–0.7)
EOSINOPHIL NFR BLD AUTO: 1.7 %
ERYTHROCYTE [DISTWIDTH] IN BLOOD BY AUTOMATED COUNT: 13 %
FASTING STATUS PATIENT QL REPORTED: NO
GFR SERPLBLD BASED ON 1.73 SQ M-ARVRAT: 89 ML/MIN/1.73M2 (ref 60–?)
GLOBULIN PLAS-MCNC: 3.8 G/DL (ref 2.8–4.4)
GLUCOSE BLD-MCNC: 101 MG/DL (ref 70–99)
HCT VFR BLD AUTO: 41.4 %
HGB BLD-MCNC: 13.4 G/DL
IMM GRANULOCYTES # BLD AUTO: 0.01 X10(3) UL (ref 0–1)
IMM GRANULOCYTES NFR BLD: 0.1 %
LYMPHOCYTES # BLD AUTO: 1.69 X10(3) UL (ref 1–4)
LYMPHOCYTES NFR BLD AUTO: 23.9 %
MCH RBC QN AUTO: 30.1 PG (ref 26–34)
MCHC RBC AUTO-ENTMCNC: 32.4 G/DL (ref 31–37)
MCV RBC AUTO: 93 FL
MONOCYTES # BLD AUTO: 0.36 X10(3) UL (ref 0.1–1)
MONOCYTES NFR BLD AUTO: 5.1 %
NEUTROPHILS # BLD AUTO: 4.88 X10 (3) UL (ref 1.5–7.7)
NEUTROPHILS # BLD AUTO: 4.88 X10(3) UL (ref 1.5–7.7)
NEUTROPHILS NFR BLD AUTO: 68.9 %
OSMOLALITY SERPL CALC.SUM OF ELEC: 288 MOSM/KG (ref 275–295)
PLATELET # BLD AUTO: 286 10(3)UL (ref 150–450)
POTASSIUM SERPL-SCNC: 4 MMOL/L (ref 3.5–5.1)
PROT SERPL-MCNC: 7.5 G/DL (ref 6.4–8.2)
RBC # BLD AUTO: 4.45 X10(6)UL
SODIUM SERPL-SCNC: 138 MMOL/L (ref 136–145)
WBC # BLD AUTO: 7.1 X10(3) UL (ref 4–11)

## 2023-01-31 PROCEDURE — 93000 ELECTROCARDIOGRAM COMPLETE: CPT | Performed by: NURSE PRACTITIONER

## 2023-01-31 PROCEDURE — 3074F SYST BP LT 130 MM HG: CPT | Performed by: NURSE PRACTITIONER

## 2023-01-31 PROCEDURE — 3008F BODY MASS INDEX DOCD: CPT | Performed by: NURSE PRACTITIONER

## 2023-01-31 PROCEDURE — 99214 OFFICE O/P EST MOD 30 MIN: CPT | Performed by: NURSE PRACTITIONER

## 2023-01-31 PROCEDURE — 85025 COMPLETE CBC W/AUTO DIFF WBC: CPT | Performed by: NURSE PRACTITIONER

## 2023-01-31 PROCEDURE — 80053 COMPREHEN METABOLIC PANEL: CPT | Performed by: NURSE PRACTITIONER

## 2023-01-31 PROCEDURE — 3079F DIAST BP 80-89 MM HG: CPT | Performed by: NURSE PRACTITIONER

## 2023-01-31 NOTE — PATIENT INSTRUCTIONS
EKG - no acute findings. Continue using CPAP    Labs pending. Scheduled for Holter monitor tomorrow. Follow up based on test results.

## 2023-02-01 ENCOUNTER — HOSPITAL ENCOUNTER (OUTPATIENT)
Dept: CV DIAGNOSTICS | Age: 63
Discharge: HOME | End: 2023-02-01
Attending: NURSE PRACTITIONER
Payer: COMMERCIAL

## 2023-02-01 DIAGNOSIS — R00.2 PALPITATIONS: ICD-10-CM

## 2023-02-01 PROCEDURE — 93225 XTRNL ECG REC<48 HRS REC: CPT | Performed by: NURSE PRACTITIONER

## 2023-02-20 ENCOUNTER — OFFICE VISIT (OUTPATIENT)
Dept: FAMILY MEDICINE CLINIC | Facility: CLINIC | Age: 63
End: 2023-02-20
Payer: COMMERCIAL

## 2023-02-20 ENCOUNTER — LAB ENCOUNTER (OUTPATIENT)
Dept: LAB | Age: 63
End: 2023-02-20
Attending: FAMILY MEDICINE
Payer: COMMERCIAL

## 2023-02-20 VITALS
SYSTOLIC BLOOD PRESSURE: 130 MMHG | DIASTOLIC BLOOD PRESSURE: 68 MMHG | WEIGHT: 261 LBS | RESPIRATION RATE: 18 BRPM | TEMPERATURE: 97 F | BODY MASS INDEX: 44.56 KG/M2 | HEIGHT: 64 IN | OXYGEN SATURATION: 97 % | HEART RATE: 66 BPM

## 2023-02-20 DIAGNOSIS — R00.2 PALPITATION: Primary | ICD-10-CM

## 2023-02-20 DIAGNOSIS — R00.2 PALPITATION: ICD-10-CM

## 2023-02-20 LAB — MAGNESIUM SERPL-MCNC: 2.3 MG/DL (ref 1.6–2.6)

## 2023-02-20 PROCEDURE — 83735 ASSAY OF MAGNESIUM: CPT | Performed by: FAMILY MEDICINE

## 2023-02-20 PROCEDURE — 99213 OFFICE O/P EST LOW 20 MIN: CPT | Performed by: FAMILY MEDICINE

## 2023-02-20 PROCEDURE — 3075F SYST BP GE 130 - 139MM HG: CPT | Performed by: FAMILY MEDICINE

## 2023-02-20 PROCEDURE — 3008F BODY MASS INDEX DOCD: CPT | Performed by: FAMILY MEDICINE

## 2023-02-20 PROCEDURE — 3078F DIAST BP <80 MM HG: CPT | Performed by: FAMILY MEDICINE

## 2023-04-03 RX ORDER — MELOXICAM 7.5 MG/1
TABLET ORAL
Qty: 90 TABLET | Refills: 1 | Status: SHIPPED | OUTPATIENT
Start: 2023-04-03

## 2023-04-03 RX ORDER — LISINOPRIL 10 MG/1
TABLET ORAL
Qty: 90 TABLET | Refills: 0 | Status: SHIPPED | OUTPATIENT
Start: 2023-04-03

## 2023-04-03 NOTE — TELEPHONE ENCOUNTER
Future appt: Your appointments     Date & Time Appointment Department [facility])    Apr 08, 2023  8:15 AM CDT what is this with REF Ilia ([facility])        Apr 19, 2023  3:30 PM CDT Follow up - Extended with [address] [facility], [address] [facility] [address], [address], [facility])            Dionisio Vo, [facility]  EDW Ref Lab [place]  [address] [place] [address]  [address] [place] [address]  [phone]        Last Appointment with provider:   2/20/2023 for follow up, palpitations. Last appointment at [facility] [place]:  2/20/2023  Cholesterol, Total (mg/dL)   Date Value   10/08/2022 197     HDL Cholesterol (mg/dL)   Date Value   10/08/2022 66 (H)     LDL Cholesterol (mg/dL)   Date Value   10/08/2022 112 (H)     Triglycerides (mg/dL)   Date Value   10/08/2022 109     No results found for: EAG, A1C  Lab Results   Component Value Date    TSH 2.250 10/08/2022       No follow-ups on file.

## 2023-04-03 NOTE — TELEPHONE ENCOUNTER
Future appt: Your appointments     Date & Time Appointment Department [facility])    Apr 08, 2023  8:15 AM CDT what is this with REF Ilia ([address])        Apr 19, 2023  3:30 PM CDT Follow up - Extended with [address], Juan Nicole, APRN [address] [facility], [place] ([facility])            Andres Quiroz, [place] Nichole  EDW Ref Lab [place]  [address] [facility] [address]  [address] [place] [address] 1076 [phone]  [phone]        Last Appointment with provider:   1/31/2023 Palpitations with Dr. Lisa Conner  Last appointment at [facility] [place]:  2/20/2023  Cholesterol, Total (mg/dL)   Date Value   10/08/2022 197     HDL Cholesterol (mg/dL)   Date Value   10/08/2022 66 (H)     LDL Cholesterol (mg/dL)   Date Value   10/08/2022 112 (H)     Triglycerides (mg/dL)   Date Value   10/08/2022 109     No results found for: EAG, A1C  Lab Results   Component Value Date    TSH 2.250 10/08/2022       No follow-ups on file.

## 2023-04-07 ENCOUNTER — TELEPHONE (OUTPATIENT)
Dept: FAMILY MEDICINE CLINIC | Facility: CLINIC | Age: 63
End: 2023-04-07

## 2023-04-07 DIAGNOSIS — E78.49 FAMILIAL MULTIPLE LIPOPROTEIN-TYPE HYPERLIPIDEMIA: Primary | ICD-10-CM

## 2023-04-07 NOTE — TELEPHONE ENCOUNTER
Patient is scheduled for fasting lab draw on 4/8 and has no order. Per patient, she is due for lipids, which are done every 6 months. She had last lipids on 10/8/22. Per verbal order of erika Mcadams to order lipids now.

## 2023-04-08 ENCOUNTER — LABORATORY ENCOUNTER (OUTPATIENT)
Dept: LAB | Age: 63
End: 2023-04-08
Attending: FAMILY MEDICINE
Payer: COMMERCIAL

## 2023-04-08 DIAGNOSIS — E78.49 FAMILIAL MULTIPLE LIPOPROTEIN-TYPE HYPERLIPIDEMIA: ICD-10-CM

## 2023-04-08 LAB
CHOLEST SERPL-MCNC: 197 MG/DL (ref ?–200)
FASTING PATIENT LIPID ANSWER: YES
HDLC SERPL-MCNC: 72 MG/DL (ref 40–59)
LDLC SERPL CALC-MCNC: 109 MG/DL (ref ?–100)
NONHDLC SERPL-MCNC: 125 MG/DL (ref ?–130)
TRIGL SERPL-MCNC: 87 MG/DL (ref 30–149)
VLDLC SERPL CALC-MCNC: 15 MG/DL (ref 0–30)

## 2023-04-08 PROCEDURE — 80061 LIPID PANEL: CPT | Performed by: NURSE PRACTITIONER

## 2023-04-26 ENCOUNTER — OFFICE VISIT (OUTPATIENT)
Dept: FAMILY MEDICINE CLINIC | Facility: CLINIC | Age: 63
End: 2023-04-26
Payer: COMMERCIAL

## 2023-04-26 VITALS
HEIGHT: 64 IN | BODY MASS INDEX: 45.3 KG/M2 | RESPIRATION RATE: 20 BRPM | TEMPERATURE: 97 F | HEART RATE: 79 BPM | SYSTOLIC BLOOD PRESSURE: 136 MMHG | DIASTOLIC BLOOD PRESSURE: 74 MMHG | WEIGHT: 265.38 LBS | OXYGEN SATURATION: 97 %

## 2023-04-26 DIAGNOSIS — M25.551 PAIN OF BOTH HIP JOINTS: ICD-10-CM

## 2023-04-26 DIAGNOSIS — I10 ESSENTIAL HYPERTENSION: Primary | ICD-10-CM

## 2023-04-26 DIAGNOSIS — G89.29 CHRONIC PAIN OF BOTH KNEES: ICD-10-CM

## 2023-04-26 DIAGNOSIS — M25.552 PAIN OF BOTH HIP JOINTS: ICD-10-CM

## 2023-04-26 DIAGNOSIS — M25.561 CHRONIC PAIN OF BOTH KNEES: ICD-10-CM

## 2023-04-26 DIAGNOSIS — M25.562 CHRONIC PAIN OF BOTH KNEES: ICD-10-CM

## 2023-04-26 PROCEDURE — 3008F BODY MASS INDEX DOCD: CPT | Performed by: NURSE PRACTITIONER

## 2023-04-26 PROCEDURE — 3078F DIAST BP <80 MM HG: CPT | Performed by: NURSE PRACTITIONER

## 2023-04-26 PROCEDURE — 99214 OFFICE O/P EST MOD 30 MIN: CPT | Performed by: NURSE PRACTITIONER

## 2023-04-26 PROCEDURE — 3075F SYST BP GE 130 - 139MM HG: CPT | Performed by: NURSE PRACTITIONER

## 2023-05-09 ENCOUNTER — TELEPHONE (OUTPATIENT)
Dept: FAMILY MEDICINE CLINIC | Facility: CLINIC | Age: 63
End: 2023-05-09

## 2023-05-09 NOTE — TELEPHONE ENCOUNTER
Patient has not had the PCV20 vaccine. Is interested in this now. Was seen for follow up, palpitations on 2/20. Was seen by [facility] for HTN follow up on 4/26/23. Please advise.

## 2023-05-09 NOTE — TELEPHONE ENCOUNTER
Left message for patient that pneumonia vaccine has been ordered and she may return call and schedule a nurse visit to receive this. No other vaccines are due from a medical standpoint. Advised patient she may check with airline about any requirements they may have.

## 2023-05-09 NOTE — TELEPHONE ENCOUNTER
Patient wants pneumonia vaccine please advise, also going to [facility] ([place] does she need any vaccines for travel, please travel

## 2023-05-17 ENCOUNTER — NURSE ONLY (OUTPATIENT)
Dept: FAMILY MEDICINE CLINIC | Facility: CLINIC | Age: 63
End: 2023-05-17
Payer: COMMERCIAL

## 2023-05-17 PROCEDURE — 90677 PCV20 VACCINE IM: CPT | Performed by: FAMILY MEDICINE

## 2023-05-17 NOTE — PROGRESS NOTES
Patient arrived to the clinic for Prenar 20 injection. Order placed by Dr. Kaleigh Olguin. Patient tolerated well and left in stable condition.

## 2023-07-03 RX ORDER — METOPROLOL SUCCINATE 50 MG/1
50 TABLET, EXTENDED RELEASE ORAL DAILY
Qty: 90 TABLET | Refills: 1 | Status: SHIPPED | OUTPATIENT
Start: 2023-07-03

## 2023-07-05 NOTE — TELEPHONE ENCOUNTER
Future appt: Your appointments       Date & Time Appointment Department [facility])    Oct 14, 2023  8:15 AM CDT Laboratory Visit with REF [address] [place] [place] (EDW Ref Lab [place])        Oct 26, 2023  3:20 PM CDT Physical - Established with [facility] [address] [facility])              Lloyd Moss [place]  EDW Ref Lab [place]  [place] [address] [id number]  [facility] [facility] [place]  [place] [address] [phone]  [phone]          Last Appointment with provider:   2/20/2023  Last appointment at [facility] [place]:  4/26/2023  Last px: 10/12/2022    Cholesterol, Total (mg/dL)   Date Value   04/08/2023 197     HDL Cholesterol (mg/dL)   Date Value   04/08/2023 72 (H)     LDL Cholesterol (mg/dL)   Date Value   04/08/2023 109 (H)     Triglycerides (mg/dL)   Date Value   04/08/2023 87     No results found for: EAG, A1C  Lab Results   Component Value Date    TSH 2.250 10/08/2022       No follow-ups on file.

## 2023-07-06 RX ORDER — LISINOPRIL 10 MG/1
10 TABLET ORAL DAILY
Qty: 90 TABLET | Refills: 0 | Status: SHIPPED | OUTPATIENT
Start: 2023-07-06

## 2023-07-21 ENCOUNTER — TELEPHONE (OUTPATIENT)
Dept: FAMILY MEDICINE CLINIC | Facility: CLINIC | Age: 63
End: 2023-07-21

## 2023-07-21 DIAGNOSIS — E66.01 CLASS 3 SEVERE OBESITY DUE TO EXCESS CALORIES WITHOUT SERIOUS COMORBIDITY WITH BODY MASS INDEX (BMI) OF 40.0 TO 44.9 IN ADULT (HCC): ICD-10-CM

## 2023-07-21 DIAGNOSIS — E78.49 FAMILIAL MULTIPLE LIPOPROTEIN-TYPE HYPERLIPIDEMIA: Primary | ICD-10-CM

## 2023-07-21 DIAGNOSIS — I10 ESSENTIAL HYPERTENSION: ICD-10-CM

## 2023-07-21 DIAGNOSIS — E55.9 VITAMIN D DEFICIENCY: ICD-10-CM

## 2023-07-21 NOTE — TELEPHONE ENCOUNTER
Future Appointments   Date Time Provider Carmen Holly   10/14/2023  8:15 AM REF [place] [facility]F [facility]   10/26/2023  3:20 PM Riley Lund MD [facility] [place] [facility] [place]     Please enter lab orders for upcoming appointment. Call patient only with questions or problems. no

## 2024-01-29 NOTE — TELEPHONE ENCOUNTER
Future appt: Your appointments     Date & Time Appointment Department [facility])    Oct 08, 2022  8:30 AM CDT Laboratory Visit with REF Jens Lopez Reference Lab (EDW Ref Lab [place])        Oct 08, 2022  8:45 AM CDT Physical - Established with Mandy Davis, [address] [place] [address] ([facility])            [facility]  [place] [address] [phone]  [facility] E [facility] Reference Lab  EDW Ref Lab [place]  [place] [address]76 [address]  [phone]        Last Appointment with provider:   4/27/2022  Last appointment at [facility] [place]:  4/27/2022  Cholesterol, Total (mg/dL)   Date Value   04/09/2022 207 (H)     HDL Cholesterol (mg/dL)   Date Value   04/09/2022 60 (H)     LDL Cholesterol (mg/dL)   Date Value   04/09/2022 127 (H)     Triglycerides (mg/dL)   Date Value   04/09/2022 112     No results found for: EAG, A1C  Lab Results   Component Value Date    TSH 1.740 02/06/2021       No follow-ups on file. N/A

## 2024-10-14 NOTE — TELEPHONE ENCOUNTER
Scripts pending for [facility] due to insurance not accepting [employer].
insurance won't cover meds picked up at [address] - not a preferred pharmacy- pt is requesting that her rxs for [facility] drops and [facility]x [facility] - pt is headed over there now
Call bell/Explanation of exam/test

## 2025-03-03 NOTE — TELEPHONE ENCOUNTER
----- Message from Jaja Ochoa MD sent at 6/23/2020  2:45 PM CDT -----  Normal (pap) results,   please notify patient.    [employer] message sent
----- Message from Shahnaz Malave MD sent at 6/22/2020  3:40 PM CDT -----  Normal HPV. Await pap   [employer] message sent.
Spoke to patient regarding the below.  No questions at this time
80 year old female presents to PST prior to scheduled left second hammertoe repair on 3/4/25 with Dr Reddy. Patient endorses over 20 years of pain in her left second digit, with redness, pain and deformity to the bone. Patient has to remove her shoe every 15 minutes for relief, able to ambulate on her own with out difficulty. PMHx includes HLD, GERD (tums) and overactive bladder. Patient recently had surgical procedure September 2024 right total knee, patient states she did well. Patient states overall she feels "fine" denies chest pain / sob / fever / chills.

## 2025-07-28 ENCOUNTER — TELEPHONE (OUTPATIENT)
Dept: FAMILY MEDICINE CLINIC | Facility: CLINIC | Age: 65
End: 2025-07-28

## 2025-07-28 RX ORDER — VIT C/B6/B5/MAGNESIUM/HERB 173 50-5-6-5MG
1 CAPSULE ORAL
COMMUNITY
Start: 2024-11-25 | End: 2025-08-16

## 2025-07-28 RX ORDER — RIBOFLAVIN (VITAMIN B2) 100 MG
100 TABLET ORAL DAILY
COMMUNITY
Start: 2024-06-25

## 2025-07-28 RX ORDER — FAMOTIDINE 20 MG/1
20 TABLET, FILM COATED ORAL NIGHTLY PRN
COMMUNITY
Start: 2024-03-25 | End: 2025-08-16

## 2025-07-30 ENCOUNTER — LAB ENCOUNTER (OUTPATIENT)
Dept: LAB | Facility: HOSPITAL | Age: 65
End: 2025-07-30
Attending: FAMILY MEDICINE

## 2025-07-30 ENCOUNTER — OFFICE VISIT (OUTPATIENT)
Dept: FAMILY MEDICINE CLINIC | Facility: CLINIC | Age: 65
End: 2025-07-30
Payer: MEDICARE

## 2025-07-30 ENCOUNTER — HOSPITAL ENCOUNTER (OUTPATIENT)
Dept: GENERAL RADIOLOGY | Facility: HOSPITAL | Age: 65
Discharge: HOME | End: 2025-07-30
Attending: FAMILY MEDICINE

## 2025-07-30 ENCOUNTER — EKG ENCOUNTER (OUTPATIENT)
Dept: LAB | Facility: HOSPITAL | Age: 65
End: 2025-07-30
Attending: FAMILY MEDICINE

## 2025-07-30 VITALS
DIASTOLIC BLOOD PRESSURE: 78 MMHG | BODY MASS INDEX: 41.78 KG/M2 | SYSTOLIC BLOOD PRESSURE: 132 MMHG | RESPIRATION RATE: 16 BRPM | HEIGHT: 66 IN | TEMPERATURE: 97 F | HEART RATE: 87 BPM | WEIGHT: 260 LBS | OXYGEN SATURATION: 98 %

## 2025-07-30 DIAGNOSIS — Z01.818 PREOP EXAMINATION: ICD-10-CM

## 2025-07-30 DIAGNOSIS — Z01.812 PRE-OPERATIVE LABORATORY EXAMINATION: ICD-10-CM

## 2025-07-30 DIAGNOSIS — I83.11 VARICOSE VEINS OF BOTH LOWER EXTREMITIES WITH INFLAMMATION: ICD-10-CM

## 2025-07-30 DIAGNOSIS — I89.0 LYMPHEDEMA OF BOTH LOWER EXTREMITIES: ICD-10-CM

## 2025-07-30 DIAGNOSIS — G47.33 OBSTRUCTIVE SLEEP APNEA: ICD-10-CM

## 2025-07-30 DIAGNOSIS — E66.813 CLASS 3 SEVERE OBESITY DUE TO EXCESS CALORIES WITHOUT SERIOUS COMORBIDITY WITH BODY MASS INDEX (BMI) OF 40.0 TO 44.9 IN ADULT: ICD-10-CM

## 2025-07-30 DIAGNOSIS — N39.0 RECURRENT UTI: ICD-10-CM

## 2025-07-30 DIAGNOSIS — M17.11 PRIMARY OSTEOARTHRITIS OF RIGHT KNEE: Primary | ICD-10-CM

## 2025-07-30 DIAGNOSIS — I83.12 VARICOSE VEINS OF BOTH LOWER EXTREMITIES WITH INFLAMMATION: ICD-10-CM

## 2025-07-30 DIAGNOSIS — R73.01 ELEVATED FASTING BLOOD SUGAR: ICD-10-CM

## 2025-07-30 DIAGNOSIS — K21.00 GASTROESOPHAGEAL REFLUX DISEASE WITH ESOPHAGITIS WITHOUT HEMORRHAGE: ICD-10-CM

## 2025-07-30 DIAGNOSIS — E78.49 FAMILIAL MULTIPLE LIPOPROTEIN-TYPE HYPERLIPIDEMIA: ICD-10-CM

## 2025-07-30 DIAGNOSIS — I10 ESSENTIAL HYPERTENSION: ICD-10-CM

## 2025-07-30 DIAGNOSIS — E55.9 VITAMIN D DEFICIENCY: ICD-10-CM

## 2025-07-30 DIAGNOSIS — J30.1 SEASONAL ALLERGIC RHINITIS DUE TO POLLEN: ICD-10-CM

## 2025-07-30 DIAGNOSIS — M15.0 PRIMARY OSTEOARTHRITIS INVOLVING MULTIPLE JOINTS: ICD-10-CM

## 2025-07-30 DIAGNOSIS — M47.816 LUMBAR SPONDYLOSIS: ICD-10-CM

## 2025-07-30 LAB
ALBUMIN SERPL-MCNC: 4.7 G/DL (ref 3.2–4.8)
ALBUMIN/GLOB SERPL: 2 (ref 1–2)
ALP LIVER SERPL-CCNC: 79 U/L (ref 55–142)
ALT SERPL-CCNC: 13 U/L (ref 10–49)
ANION GAP SERPL CALC-SCNC: 9 MMOL/L (ref 0–18)
APTT PPP: 28.6 SECONDS (ref 23–36)
AST SERPL-CCNC: 14 U/L (ref ?–34)
BASOPHILS # BLD AUTO: 0.02 X10(3) UL (ref 0–0.2)
BASOPHILS NFR BLD AUTO: 0.3 %
BILIRUB SERPL-MCNC: 0.6 MG/DL (ref 0.2–1.1)
BILIRUB UR QL: NEGATIVE
BUN BLD-MCNC: 15 MG/DL (ref 9–23)
BUN/CREAT SERPL: 18.3 (ref 10–20)
CALCIUM BLD-MCNC: 9.4 MG/DL (ref 8.7–10.4)
CHLORIDE SERPL-SCNC: 106 MMOL/L (ref 98–112)
CLARITY UR: CLEAR
CO2 SERPL-SCNC: 26 MMOL/L (ref 21–32)
COLOR UR: COLORLESS
CREAT BLD-MCNC: 0.82 MG/DL (ref 0.55–1.02)
DEPRECATED RDW RBC AUTO: 42.5 FL (ref 35.1–46.3)
EGFRCR SERPLBLD CKD-EPI 2021: 79 ML/MIN/1.73M2 (ref 60–?)
EOSINOPHIL # BLD AUTO: 0.11 X10(3) UL (ref 0–0.7)
EOSINOPHIL NFR BLD AUTO: 1.9 %
ERYTHROCYTE [DISTWIDTH] IN BLOOD BY AUTOMATED COUNT: 12.8 % (ref 11–15)
EST. AVERAGE GLUCOSE BLD GHB EST-MCNC: 114 MG/DL (ref 68–126)
FASTING STATUS PATIENT QL REPORTED: YES
GLOBULIN PLAS-MCNC: 2.3 G/DL (ref 2–3.5)
GLUCOSE BLD-MCNC: 95 MG/DL (ref 70–99)
GLUCOSE UR-MCNC: NORMAL MG/DL
HBA1C MFR BLD: 5.6 % (ref ?–5.7)
HCT VFR BLD AUTO: 41.9 % (ref 35–48)
HGB BLD-MCNC: 13.6 G/DL (ref 12–16)
HGB UR QL STRIP.AUTO: NEGATIVE
IMM GRANULOCYTES # BLD AUTO: 0.01 X10(3) UL (ref 0–1)
IMM GRANULOCYTES NFR BLD: 0.2 %
INR BLD: 1.01 (ref 0.8–1.2)
KETONES UR-MCNC: NEGATIVE MG/DL
LEUKOCYTE ESTERASE UR QL STRIP.AUTO: NEGATIVE
LYMPHOCYTES # BLD AUTO: 1.66 X10(3) UL (ref 1–4)
LYMPHOCYTES NFR BLD AUTO: 28 %
MCH RBC QN AUTO: 29.6 PG (ref 26–34)
MCHC RBC AUTO-ENTMCNC: 32.5 G/DL (ref 31–37)
MCV RBC AUTO: 91.1 FL (ref 80–100)
MONOCYTES # BLD AUTO: 0.31 X10(3) UL (ref 0.1–1)
MONOCYTES NFR BLD AUTO: 5.2 %
NEUTROPHILS # BLD AUTO: 3.82 X10 (3) UL (ref 1.5–7.7)
NEUTROPHILS # BLD AUTO: 3.82 X10(3) UL (ref 1.5–7.7)
NEUTROPHILS NFR BLD AUTO: 64.4 %
NITRITE UR QL STRIP.AUTO: NEGATIVE
OSMOLALITY SERPL CALC.SUM OF ELEC: 293 MOSM/KG (ref 275–295)
PH UR: 7 (ref 5–8)
PLATELET # BLD AUTO: 302 10(3)UL (ref 150–450)
POTASSIUM SERPL-SCNC: 4.2 MMOL/L (ref 3.5–5.1)
PROT SERPL-MCNC: 7 G/DL (ref 5.7–8.2)
PROT UR-MCNC: NEGATIVE MG/DL
PROTHROMBIN TIME: 13.9 SECONDS (ref 11.6–14.8)
RBC # BLD AUTO: 4.6 X10(6)UL (ref 3.8–5.3)
SODIUM SERPL-SCNC: 141 MMOL/L (ref 136–145)
SP GR UR STRIP: 1.01 (ref 1–1.03)
UROBILINOGEN UR STRIP-ACNC: NORMAL
WBC # BLD AUTO: 5.9 X10(3) UL (ref 4–11)

## 2025-07-30 PROCEDURE — 83036 HEMOGLOBIN GLYCOSYLATED A1C: CPT

## 2025-07-30 PROCEDURE — 81003 URINALYSIS AUTO W/O SCOPE: CPT

## 2025-07-30 PROCEDURE — 99499 UNLISTED E&M SERVICE: CPT | Performed by: FAMILY MEDICINE

## 2025-07-30 PROCEDURE — 80053 COMPREHEN METABOLIC PANEL: CPT

## 2025-07-30 PROCEDURE — 87081 CULTURE SCREEN ONLY: CPT

## 2025-07-30 PROCEDURE — 36415 COLL VENOUS BLD VENIPUNCTURE: CPT

## 2025-07-30 PROCEDURE — 93005 ELECTROCARDIOGRAM TRACING: CPT

## 2025-07-30 PROCEDURE — 3008F BODY MASS INDEX DOCD: CPT | Performed by: FAMILY MEDICINE

## 2025-07-30 PROCEDURE — 85025 COMPLETE CBC W/AUTO DIFF WBC: CPT

## 2025-07-30 PROCEDURE — 85610 PROTHROMBIN TIME: CPT

## 2025-07-30 PROCEDURE — 99214 OFFICE O/P EST MOD 30 MIN: CPT | Performed by: FAMILY MEDICINE

## 2025-07-30 PROCEDURE — 1125F AMNT PAIN NOTED PAIN PRSNT: CPT | Performed by: FAMILY MEDICINE

## 2025-07-30 PROCEDURE — 85730 THROMBOPLASTIN TIME PARTIAL: CPT

## 2025-07-30 PROCEDURE — 1159F MED LIST DOCD IN RCRD: CPT | Performed by: FAMILY MEDICINE

## 2025-07-30 PROCEDURE — 87147 CULTURE TYPE IMMUNOLOGIC: CPT

## 2025-07-30 PROCEDURE — 71046 X-RAY EXAM CHEST 2 VIEWS: CPT | Performed by: FAMILY MEDICINE

## 2025-07-30 PROCEDURE — 93010 ELECTROCARDIOGRAM REPORT: CPT | Performed by: INTERNAL MEDICINE

## 2025-07-30 PROCEDURE — 1160F RVW MEDS BY RX/DR IN RCRD: CPT | Performed by: FAMILY MEDICINE

## 2025-07-30 PROCEDURE — 3078F DIAST BP <80 MM HG: CPT | Performed by: FAMILY MEDICINE

## 2025-07-30 PROCEDURE — 3075F SYST BP GE 130 - 139MM HG: CPT | Performed by: FAMILY MEDICINE

## 2025-07-31 LAB
ATRIAL RATE: 77 BPM
P AXIS: 41 DEGREES
P-R INTERVAL: 166 MS
Q-T INTERVAL: 382 MS
QRS DURATION: 88 MS
QTC CALCULATION (BEZET): 432 MS
R AXIS: 27 DEGREES
T AXIS: 30 DEGREES
VENTRICULAR RATE: 77 BPM

## 2025-07-31 RX ORDER — MUPIROCIN 2 %
OINTMENT (GRAM) TOPICAL
Qty: 22 G | Refills: 0 | Status: SHIPPED | OUTPATIENT
Start: 2025-07-31

## 2025-07-31 RX ORDER — MUPIROCIN 2 %
OINTMENT (GRAM) TOPICAL
Qty: 22 G | Refills: 0 | Status: SHIPPED | OUTPATIENT
Start: 2025-07-31 | End: 2025-07-31

## 2025-08-15 ENCOUNTER — HOSPITAL ENCOUNTER (OUTPATIENT)
Facility: HOSPITAL | Age: 65
Discharge: HOME | End: 2025-08-16
Attending: STUDENT IN AN ORGANIZED HEALTH CARE EDUCATION/TRAINING PROGRAM | Admitting: STUDENT IN AN ORGANIZED HEALTH CARE EDUCATION/TRAINING PROGRAM

## 2025-08-15 ENCOUNTER — ANESTHESIA EVENT (OUTPATIENT)
Dept: SURGERY | Facility: HOSPITAL | Age: 65
End: 2025-08-15

## 2025-08-15 ENCOUNTER — ANESTHESIA (OUTPATIENT)
Dept: SURGERY | Facility: HOSPITAL | Age: 65
End: 2025-08-15

## 2025-08-15 ENCOUNTER — APPOINTMENT (OUTPATIENT)
Dept: GENERAL RADIOLOGY | Facility: HOSPITAL | Age: 65
End: 2025-08-15

## 2025-08-15 DIAGNOSIS — M17.11 PRIMARY OSTEOARTHRITIS OF RIGHT KNEE: ICD-10-CM

## 2025-08-15 PROBLEM — E66.01 MORBID OBESITY WITH BMI OF 40.0-44.9, ADULT (HCC): Status: ACTIVE | Noted: 2025-08-15

## 2025-08-15 PROBLEM — Z96.651 S/P TOTAL KNEE ARTHROPLASTY, RIGHT: Status: ACTIVE | Noted: 2025-08-15

## 2025-08-15 PROCEDURE — 99204 OFFICE O/P NEW MOD 45 MIN: CPT | Performed by: HOSPITALIST

## 2025-08-15 PROCEDURE — 73560 X-RAY EXAM OF KNEE 1 OR 2: CPT

## 2025-08-15 DEVICE — IMPLANTABLE DEVICE: Type: IMPLANTABLE DEVICE | Site: KNEE | Status: FUNCTIONAL

## 2025-08-15 DEVICE — IMPLANTABLE DEVICE: Type: IMPLANTABLE DEVICE | Status: FUNCTIONAL

## 2025-08-15 DEVICE — CEMENT BNE 40GM W/ GENT HI VISC RADPQ: Type: IMPLANTABLE DEVICE | Site: KNEE | Status: FUNCTIONAL

## 2025-08-15 RX ORDER — SODIUM PHOSPHATE, DIBASIC AND SODIUM PHOSPHATE, MONOBASIC 7; 19 G/230ML; G/230ML
1 ENEMA RECTAL ONCE AS NEEDED
Status: DISCONTINUED | OUTPATIENT
Start: 2025-08-15 | End: 2025-08-16

## 2025-08-15 RX ORDER — FAMOTIDINE 20 MG/1
20 TABLET, FILM COATED ORAL ONCE
Status: COMPLETED | OUTPATIENT
Start: 2025-08-15 | End: 2025-08-15

## 2025-08-15 RX ORDER — METOPROLOL TARTRATE 25 MG/1
25 TABLET, FILM COATED ORAL ONCE AS NEEDED
Status: DISCONTINUED | OUTPATIENT
Start: 2025-08-15 | End: 2025-08-15 | Stop reason: HOSPADM

## 2025-08-15 RX ORDER — ACETAMINOPHEN 500 MG
1000 TABLET ORAL ONCE
Status: COMPLETED | OUTPATIENT
Start: 2025-08-15 | End: 2025-08-15

## 2025-08-15 RX ORDER — TRANEXAMIC ACID 10 MG/ML
INJECTION, SOLUTION INTRAVENOUS AS NEEDED
Status: DISCONTINUED | OUTPATIENT
Start: 2025-08-15 | End: 2025-08-15 | Stop reason: SURG

## 2025-08-15 RX ORDER — LIDOCAINE HYDROCHLORIDE 10 MG/ML
INJECTION, SOLUTION EPIDURAL; INFILTRATION; INTRACAUDAL; PERINEURAL AS NEEDED
Status: DISCONTINUED | OUTPATIENT
Start: 2025-08-15 | End: 2025-08-15 | Stop reason: SURG

## 2025-08-15 RX ORDER — ACETAMINOPHEN 500 MG
1000 TABLET ORAL ONCE
Status: DISCONTINUED | OUTPATIENT
Start: 2025-08-15 | End: 2025-08-15 | Stop reason: HOSPADM

## 2025-08-15 RX ORDER — FAMOTIDINE 10 MG/ML
20 INJECTION, SOLUTION INTRAVENOUS ONCE
Status: COMPLETED | OUTPATIENT
Start: 2025-08-15 | End: 2025-08-15

## 2025-08-15 RX ORDER — METOCLOPRAMIDE 10 MG/1
10 TABLET ORAL ONCE
Status: COMPLETED | OUTPATIENT
Start: 2025-08-15 | End: 2025-08-15

## 2025-08-15 RX ORDER — METOCLOPRAMIDE HYDROCHLORIDE 5 MG/ML
10 INJECTION INTRAMUSCULAR; INTRAVENOUS ONCE
Status: COMPLETED | OUTPATIENT
Start: 2025-08-15 | End: 2025-08-15

## 2025-08-15 RX ORDER — METOCLOPRAMIDE HYDROCHLORIDE 5 MG/ML
10 INJECTION INTRAMUSCULAR; INTRAVENOUS EVERY 8 HOURS PRN
Status: DISCONTINUED | OUTPATIENT
Start: 2025-08-15 | End: 2025-08-16

## 2025-08-15 RX ORDER — HYDROMORPHONE HYDROCHLORIDE 1 MG/ML
0.6 INJECTION, SOLUTION INTRAMUSCULAR; INTRAVENOUS; SUBCUTANEOUS EVERY 5 MIN PRN
Status: DISCONTINUED | OUTPATIENT
Start: 2025-08-15 | End: 2025-08-15 | Stop reason: HOSPADM

## 2025-08-15 RX ORDER — HYDROMORPHONE HYDROCHLORIDE 1 MG/ML
0.4 INJECTION, SOLUTION INTRAMUSCULAR; INTRAVENOUS; SUBCUTANEOUS EVERY 5 MIN PRN
Status: DISCONTINUED | OUTPATIENT
Start: 2025-08-15 | End: 2025-08-15 | Stop reason: HOSPADM

## 2025-08-15 RX ORDER — POLYETHYLENE GLYCOL 3350 17 G/17G
17 POWDER, FOR SOLUTION ORAL DAILY PRN
Status: DISCONTINUED | OUTPATIENT
Start: 2025-08-15 | End: 2025-08-16

## 2025-08-15 RX ORDER — KETAMINE HYDROCHLORIDE 50 MG/ML
INJECTION, SOLUTION INTRAMUSCULAR; INTRAVENOUS AS NEEDED
Status: DISCONTINUED | OUTPATIENT
Start: 2025-08-15 | End: 2025-08-15 | Stop reason: SURG

## 2025-08-15 RX ORDER — OXYCODONE HYDROCHLORIDE 5 MG/1
5 TABLET ORAL EVERY 4 HOURS PRN
Status: DISCONTINUED | OUTPATIENT
Start: 2025-08-15 | End: 2025-08-16

## 2025-08-15 RX ORDER — DOCUSATE SODIUM 100 MG/1
100 CAPSULE, LIQUID FILLED ORAL 2 TIMES DAILY
Status: DISCONTINUED | OUTPATIENT
Start: 2025-08-15 | End: 2025-08-16

## 2025-08-15 RX ORDER — ONDANSETRON 2 MG/ML
4 INJECTION INTRAMUSCULAR; INTRAVENOUS EVERY 6 HOURS PRN
Status: DISCONTINUED | OUTPATIENT
Start: 2025-08-15 | End: 2025-08-16

## 2025-08-15 RX ORDER — DIPHENHYDRAMINE HYDROCHLORIDE 50 MG/ML
12.5 INJECTION, SOLUTION INTRAMUSCULAR; INTRAVENOUS EVERY 4 HOURS PRN
Status: DISCONTINUED | OUTPATIENT
Start: 2025-08-15 | End: 2025-08-16

## 2025-08-15 RX ORDER — NALOXONE HYDROCHLORIDE 0.4 MG/ML
0.08 INJECTION, SOLUTION INTRAMUSCULAR; INTRAVENOUS; SUBCUTANEOUS AS NEEDED
Status: DISCONTINUED | OUTPATIENT
Start: 2025-08-15 | End: 2025-08-15 | Stop reason: HOSPADM

## 2025-08-15 RX ORDER — MIDAZOLAM HYDROCHLORIDE 1 MG/ML
INJECTION INTRAMUSCULAR; INTRAVENOUS AS NEEDED
Status: DISCONTINUED | OUTPATIENT
Start: 2025-08-15 | End: 2025-08-15 | Stop reason: SURG

## 2025-08-15 RX ORDER — ASPIRIN 81 MG/1
81 TABLET ORAL 2 TIMES DAILY
Status: DISCONTINUED | OUTPATIENT
Start: 2025-08-15 | End: 2025-08-16

## 2025-08-15 RX ORDER — SODIUM CHLORIDE 9 MG/ML
INJECTION, SOLUTION INTRAVENOUS CONTINUOUS
Status: DISCONTINUED | OUTPATIENT
Start: 2025-08-15 | End: 2025-08-16

## 2025-08-15 RX ORDER — SODIUM CHLORIDE, SODIUM LACTATE, POTASSIUM CHLORIDE, CALCIUM CHLORIDE 600; 310; 30; 20 MG/100ML; MG/100ML; MG/100ML; MG/100ML
INJECTION, SOLUTION INTRAVENOUS CONTINUOUS
Status: DISCONTINUED | OUTPATIENT
Start: 2025-08-15 | End: 2025-08-16

## 2025-08-15 RX ORDER — MAGNESIUM HYDROXIDE 1200 MG/15ML
LIQUID ORAL CONTINUOUS PRN
Status: DISCONTINUED | OUTPATIENT
Start: 2025-08-15 | End: 2025-08-15 | Stop reason: HOSPADM

## 2025-08-15 RX ORDER — MORPHINE SULFATE 4 MG/ML
2 INJECTION, SOLUTION INTRAMUSCULAR; INTRAVENOUS EVERY 10 MIN PRN
Status: DISCONTINUED | OUTPATIENT
Start: 2025-08-15 | End: 2025-08-15 | Stop reason: HOSPADM

## 2025-08-15 RX ORDER — LISINOPRIL 10 MG/1
10 TABLET ORAL DAILY
Status: DISCONTINUED | OUTPATIENT
Start: 2025-08-16 | End: 2025-08-16

## 2025-08-15 RX ORDER — KETOROLAC TROMETHAMINE 30 MG/ML
30 INJECTION, SOLUTION INTRAMUSCULAR; INTRAVENOUS EVERY 6 HOURS
Status: DISCONTINUED | OUTPATIENT
Start: 2025-08-15 | End: 2025-08-16

## 2025-08-15 RX ORDER — DEXAMETHASONE SODIUM PHOSPHATE 4 MG/ML
VIAL (ML) INJECTION AS NEEDED
Status: DISCONTINUED | OUTPATIENT
Start: 2025-08-15 | End: 2025-08-15 | Stop reason: SURG

## 2025-08-15 RX ORDER — DEXAMETHASONE SODIUM PHOSPHATE 10 MG/ML
INJECTION, SOLUTION INTRAMUSCULAR; INTRAVENOUS
Status: COMPLETED | OUTPATIENT
Start: 2025-08-15 | End: 2025-08-15

## 2025-08-15 RX ORDER — SODIUM CHLORIDE, SODIUM LACTATE, POTASSIUM CHLORIDE, CALCIUM CHLORIDE 600; 310; 30; 20 MG/100ML; MG/100ML; MG/100ML; MG/100ML
INJECTION, SOLUTION INTRAVENOUS CONTINUOUS
Status: DISCONTINUED | OUTPATIENT
Start: 2025-08-15 | End: 2025-08-15 | Stop reason: HOSPADM

## 2025-08-15 RX ORDER — DIPHENHYDRAMINE HCL 25 MG
25 CAPSULE ORAL EVERY 4 HOURS PRN
Status: DISCONTINUED | OUTPATIENT
Start: 2025-08-15 | End: 2025-08-16

## 2025-08-15 RX ORDER — BUPIVACAINE HYDROCHLORIDE 7.5 MG/ML
INJECTION, SOLUTION INTRASPINAL AS NEEDED
Status: DISCONTINUED | OUTPATIENT
Start: 2025-08-15 | End: 2025-08-15 | Stop reason: SURG

## 2025-08-15 RX ORDER — TRAMADOL HYDROCHLORIDE 50 MG/1
50 TABLET ORAL EVERY 6 HOURS SCHEDULED
Status: DISCONTINUED | OUTPATIENT
Start: 2025-08-15 | End: 2025-08-16

## 2025-08-15 RX ORDER — PHENYLEPHRINE HCL 10 MG/ML
VIAL (ML) INJECTION AS NEEDED
Status: DISCONTINUED | OUTPATIENT
Start: 2025-08-15 | End: 2025-08-15 | Stop reason: SURG

## 2025-08-15 RX ORDER — BISACODYL 10 MG
10 SUPPOSITORY, RECTAL RECTAL
Status: DISCONTINUED | OUTPATIENT
Start: 2025-08-15 | End: 2025-08-16

## 2025-08-15 RX ORDER — DIPHENHYDRAMINE HYDROCHLORIDE 50 MG/ML
25 INJECTION, SOLUTION INTRAMUSCULAR; INTRAVENOUS ONCE AS NEEDED
Status: ACTIVE | OUTPATIENT
Start: 2025-08-15 | End: 2025-08-15

## 2025-08-15 RX ORDER — CELECOXIB 200 MG/1
400 CAPSULE ORAL ONCE
Status: COMPLETED | OUTPATIENT
Start: 2025-08-15 | End: 2025-08-15

## 2025-08-15 RX ORDER — MORPHINE SULFATE 4 MG/ML
4 INJECTION, SOLUTION INTRAMUSCULAR; INTRAVENOUS EVERY 10 MIN PRN
Status: DISCONTINUED | OUTPATIENT
Start: 2025-08-15 | End: 2025-08-15 | Stop reason: HOSPADM

## 2025-08-15 RX ORDER — SENNOSIDES 8.6 MG
17.2 TABLET ORAL NIGHTLY
Status: DISCONTINUED | OUTPATIENT
Start: 2025-08-15 | End: 2025-08-16

## 2025-08-15 RX ORDER — ROPIVACAINE HYDROCHLORIDE 5 MG/ML
INJECTION, SOLUTION EPIDURAL; INFILTRATION; PERINEURAL
Status: COMPLETED | OUTPATIENT
Start: 2025-08-15 | End: 2025-08-15

## 2025-08-15 RX ORDER — HYDROMORPHONE HYDROCHLORIDE 1 MG/ML
0.2 INJECTION, SOLUTION INTRAMUSCULAR; INTRAVENOUS; SUBCUTANEOUS EVERY 5 MIN PRN
Status: DISCONTINUED | OUTPATIENT
Start: 2025-08-15 | End: 2025-08-15 | Stop reason: HOSPADM

## 2025-08-15 RX ORDER — ACETAMINOPHEN 500 MG
1000 TABLET ORAL EVERY 6 HOURS
Status: DISCONTINUED | OUTPATIENT
Start: 2025-08-15 | End: 2025-08-16

## 2025-08-15 RX ORDER — MORPHINE SULFATE 10 MG/ML
6 INJECTION, SOLUTION INTRAMUSCULAR; INTRAVENOUS EVERY 10 MIN PRN
Status: DISCONTINUED | OUTPATIENT
Start: 2025-08-15 | End: 2025-08-15 | Stop reason: HOSPADM

## 2025-08-15 RX ORDER — ONDANSETRON 2 MG/ML
INJECTION INTRAMUSCULAR; INTRAVENOUS AS NEEDED
Status: DISCONTINUED | OUTPATIENT
Start: 2025-08-15 | End: 2025-08-15 | Stop reason: SURG

## 2025-08-15 RX ORDER — METOPROLOL SUCCINATE 50 MG/1
50 TABLET, EXTENDED RELEASE ORAL DAILY
Status: DISCONTINUED | OUTPATIENT
Start: 2025-08-16 | End: 2025-08-16

## 2025-08-15 RX ADMIN — BUPIVACAINE HYDROCHLORIDE 1.6 ML: 7.5 INJECTION, SOLUTION INTRASPINAL at 13:59:00

## 2025-08-15 RX ADMIN — DEXAMETHASONE SODIUM PHOSPHATE 8 MG: 4 MG/ML VIAL (ML) INJECTION at 14:01:00

## 2025-08-15 RX ADMIN — LIDOCAINE HYDROCHLORIDE 50 MG: 10 INJECTION, SOLUTION EPIDURAL; INFILTRATION; INTRACAUDAL; PERINEURAL at 14:01:00

## 2025-08-15 RX ADMIN — TRANEXAMIC ACID 1000 MG: 10 INJECTION, SOLUTION INTRAVENOUS at 14:07:00

## 2025-08-15 RX ADMIN — SODIUM CHLORIDE, SODIUM LACTATE, POTASSIUM CHLORIDE, CALCIUM CHLORIDE: 600; 310; 30; 20 INJECTION, SOLUTION INTRAVENOUS at 13:51:00

## 2025-08-15 RX ADMIN — SODIUM CHLORIDE, SODIUM LACTATE, POTASSIUM CHLORIDE, CALCIUM CHLORIDE: 600; 310; 30; 20 INJECTION, SOLUTION INTRAVENOUS at 15:58:00

## 2025-08-15 RX ADMIN — ROPIVACAINE HYDROCHLORIDE 30 ML: 5 INJECTION, SOLUTION EPIDURAL; INFILTRATION; PERINEURAL at 13:30:00

## 2025-08-15 RX ADMIN — LIDOCAINE HYDROCHLORIDE 5 MG: 10 INJECTION, SOLUTION EPIDURAL; INFILTRATION; INTRACAUDAL; PERINEURAL at 13:55:00

## 2025-08-15 RX ADMIN — PHENYLEPHRINE HCL 100 MCG: 10 MG/ML VIAL (ML) INJECTION at 14:39:00

## 2025-08-15 RX ADMIN — MIDAZOLAM HYDROCHLORIDE 2 MG: 1 INJECTION INTRAMUSCULAR; INTRAVENOUS at 13:51:00

## 2025-08-15 RX ADMIN — LIDOCAINE HYDROCHLORIDE 5 MG: 10 INJECTION, SOLUTION EPIDURAL; INFILTRATION; INTRACAUDAL; PERINEURAL at 13:58:00

## 2025-08-15 RX ADMIN — KETAMINE HYDROCHLORIDE 15 MG: 50 INJECTION, SOLUTION INTRAMUSCULAR; INTRAVENOUS at 14:13:00

## 2025-08-15 RX ADMIN — DEXAMETHASONE SODIUM PHOSPHATE 10 MG: 10 INJECTION, SOLUTION INTRAMUSCULAR; INTRAVENOUS at 13:30:00

## 2025-08-15 RX ADMIN — KETAMINE HYDROCHLORIDE 15 MG: 50 INJECTION, SOLUTION INTRAMUSCULAR; INTRAVENOUS at 14:30:00

## 2025-08-15 RX ADMIN — PHENYLEPHRINE HCL 100 MCG: 10 MG/ML VIAL (ML) INJECTION at 14:19:00

## 2025-08-15 RX ADMIN — ONDANSETRON 4 MG: 2 INJECTION INTRAMUSCULAR; INTRAVENOUS at 15:00:00

## 2025-08-16 VITALS
RESPIRATION RATE: 18 BRPM | DIASTOLIC BLOOD PRESSURE: 59 MMHG | HEIGHT: 66 IN | OXYGEN SATURATION: 96 % | SYSTOLIC BLOOD PRESSURE: 106 MMHG | TEMPERATURE: 98 F | WEIGHT: 263 LBS | HEART RATE: 96 BPM | BODY MASS INDEX: 42.27 KG/M2

## 2025-08-16 PROCEDURE — 99214 OFFICE O/P EST MOD 30 MIN: CPT | Performed by: STUDENT IN AN ORGANIZED HEALTH CARE EDUCATION/TRAINING PROGRAM

## (undated) DIAGNOSIS — E55.9 VITAMIN D DEFICIENCY: ICD-10-CM

## (undated) DIAGNOSIS — E78.49 FAMILIAL MULTIPLE LIPOPROTEIN-TYPE HYPERLIPIDEMIA: Primary | ICD-10-CM

## (undated) DIAGNOSIS — Z00.00 WELLNESS EXAMINATION: Primary | ICD-10-CM

## (undated) DIAGNOSIS — E78.49 FAMILIAL MULTIPLE LIPOPROTEIN-TYPE HYPERLIPIDEMIA: ICD-10-CM

## (undated) DIAGNOSIS — I10 ESSENTIAL HYPERTENSION: Primary | ICD-10-CM

## (undated) DEVICE — Device

## (undated) DEVICE — DRESSING SUR 9X25CM SIL SP CVR WTRPRF VIR

## (undated) DEVICE — SOLUTION IRRIG 1000ML 0.9% NACL USP BTL

## (undated) DEVICE — NEEDLE SPNL 18GA L3.5IN W/ QNCKE SHARPER BVL

## (undated) DEVICE — TRAY PREP WET SKIN 4 COMPARTMENT 6 SPNG 2 TWL

## (undated) DEVICE — HOOD STERI-SHIELD 408-800-000

## (undated) DEVICE — TROCAR-Z

## (undated) DEVICE — SUT COAT VCRL+ 1 18IN CTX ABSRB VLT ANTIBACT

## (undated) DEVICE — GOWN, ORBIS, LVL3, 2XL, ST

## (undated) DEVICE — HANDPIECE IRRIG BTTRY OPERATED TYP W/ SUCT HI

## (undated) DEVICE — SUT COAT VCRL 0 27IN CT-1 ABSRB VLT 36MM 1/2

## (undated) DEVICE — SUT MCRYL 2-0 36IN CT-1 ABSRB UD L36MM TAPR P

## (undated) DEVICE — 33MM MG 2.5 HEX HEAD PIN-DJ

## (undated) DEVICE — PACK CDS TOTAL KNEE

## (undated) DEVICE — SOLUTION IRRIG 3000ML 0.9% NACL FLX CONT

## (undated) DEVICE — DRAPE SUR W53XL77IN STD POLYPR SMS 3 QTR SHT

## (undated) DEVICE — SYRINGE MED 20ML STD CLR PLAS LL TIP N CTRL

## (undated) DEVICE — BLADE SAW 1.14X2.17IN THK0.025IN CUT

## (undated) DEVICE — APPLICATOR PREP 26ML CHG 2% ISO ALC 70%

## (undated) DEVICE — SUT MCRYL 3-0 27IN ABSRB UD L24MM PS-1

## (undated) DEVICE — 50MM MG 2.5 HEX HEAD PIN-DJ

## (undated) NOTE — MR AVS SNAPSHOT
[address]  [place] [id number]  [phone]               Thank you for choosing us for your health care visit with Lacy Slade MD.  We are glad to serve you and happy to provide you with this summary of yo Hypertrophy of uterus [N85.2], Morbid obesity due to excess calories ([facility] [address] [E66.01], Encounter for gynecological examination without abnormal finding [Z01.419]           Vitamin D, 25-Hydroxy [E]    Complete by: Mar 02, 2017 (Approximate)    Assoc Dx:   [facility] Instructions and Information about Your Health    Await labs. Follow up for sleep in 1 months sooner if problems.           Allergies as of Mar 02, 2017     No Known Allergies                 Today's Vital Signs     BP Pulse Temp Height Weight BMI    148/

## (undated) NOTE — MR AVS SNAPSHOT
After Visit Summary   10/2/2021    Yaneth Harry   MRN: [id number]           Visit Information     Date & Time  10/2/2021  8:00 AM Provider  Beba Carlin  [address], [facility] Dept.  Phone  [phone] CREATININE) [[id number] CUSTOM]     COMP METABOLIC PANEL (14) [4164401 CUSTOM]     LIPID PANEL [0267872 CUSTOM]     URINALYSIS WITH CULTURE REFLEX [6537634 CUSTOM]     ZOSTER VACC LIVE SUBQ NJX [15833 CPT(R)]     Future Labs/Procedures Expected by Expires

## (undated) NOTE — MR AVS SNAPSHOT
[address]  [place] [id number]  [phone]               Thank you for choosing us for your health care visit with David Patel MD.  We are glad to serve you and happy to provide you with this summary of yo 114/70 mmHg 88 98.4 °F (36.9 °C) 64\" 257 lb 44.09 kg/m2         Current Medications          This list is accurate as of: 1/28/17 10:18 AM.  Always use your most recent med list.                B Complex-B12 Tabs           Calcium 600 MG Tabs           C Don’t eat while when you’re bored.      EAT THESE FOODS MORE OFTEN: EAT THESE FOODS LESS OFTEN:   Make half your plate fruits and vegetables Highly refined, white starches including white bread, rice and pasta   Eat plenty of protein, keep the fat content l